# Patient Record
Sex: FEMALE | Race: BLACK OR AFRICAN AMERICAN | Employment: FULL TIME | URBAN - METROPOLITAN AREA
[De-identification: names, ages, dates, MRNs, and addresses within clinical notes are randomized per-mention and may not be internally consistent; named-entity substitution may affect disease eponyms.]

---

## 2017-05-22 ENCOUNTER — TELEPHONE (OUTPATIENT)
Dept: FAMILY MEDICINE CLINIC | Age: 52
End: 2017-05-22

## 2017-05-22 DIAGNOSIS — R92.8 ABNORMAL MAMMOGRAM: Primary | ICD-10-CM

## 2017-06-02 ENCOUNTER — HOSPITAL ENCOUNTER (OUTPATIENT)
Dept: MAMMOGRAPHY | Age: 52
Discharge: HOME | End: 2017-06-02
Payer: COMMERCIAL

## 2017-06-02 ENCOUNTER — TELEPHONE (OUTPATIENT)
Dept: FAMILY MEDICINE CLINIC | Age: 52
End: 2017-06-02

## 2017-06-02 DIAGNOSIS — Z13.9 SCREENING: Primary | ICD-10-CM

## 2017-06-02 DIAGNOSIS — R92.8 ABNORMAL MAMMOGRAM: ICD-10-CM

## 2017-06-02 DIAGNOSIS — Z12.31 VISIT FOR SCREENING MAMMOGRAM: ICD-10-CM

## 2017-06-02 PROCEDURE — 77063 BREAST TOMOSYNTHESIS BI: CPT

## 2017-06-30 ENCOUNTER — OFFICE VISIT (OUTPATIENT)
Dept: FAMILY MEDICINE CLINIC | Age: 52
End: 2017-06-30
Payer: COMMERCIAL

## 2017-06-30 VITALS
TEMPERATURE: 97.3 F | SYSTOLIC BLOOD PRESSURE: 129 MMHG | DIASTOLIC BLOOD PRESSURE: 79 MMHG | BODY MASS INDEX: 38.71 KG/M2 | WEIGHT: 205 LBS | HEIGHT: 61 IN | HEART RATE: 91 BPM

## 2017-06-30 DIAGNOSIS — Z23 NEED FOR TDAP VACCINATION: ICD-10-CM

## 2017-06-30 DIAGNOSIS — E66.9 OBESITY (BMI 30-39.9): Primary | ICD-10-CM

## 2017-06-30 DIAGNOSIS — Z13.9 SCREENING: ICD-10-CM

## 2017-06-30 DIAGNOSIS — Z99.89 OSA ON CPAP: ICD-10-CM

## 2017-06-30 DIAGNOSIS — G47.33 OSA ON CPAP: ICD-10-CM

## 2017-06-30 DIAGNOSIS — N95.1 PERIMENOPAUSAL: ICD-10-CM

## 2017-06-30 PROCEDURE — 90471 IMMUNIZATION ADMIN: CPT | Performed by: INTERNAL MEDICINE

## 2017-06-30 PROCEDURE — 99213 OFFICE O/P EST LOW 20 MIN: CPT | Performed by: INTERNAL MEDICINE

## 2017-06-30 PROCEDURE — 90715 TDAP VACCINE 7 YRS/> IM: CPT | Performed by: INTERNAL MEDICINE

## 2017-06-30 ASSESSMENT — ENCOUNTER SYMPTOMS
EYE REDNESS: 0
NAUSEA: 0
BACK PAIN: 0
BLURRED VISION: 0
ABDOMINAL PAIN: 0
DIARRHEA: 0

## 2017-07-01 ASSESSMENT — ENCOUNTER SYMPTOMS
SHORTNESS OF BREATH: 0
COUGH: 0

## 2017-08-28 ENCOUNTER — OFFICE VISIT (OUTPATIENT)
Dept: FAMILY MEDICINE CLINIC | Age: 52
End: 2017-08-28
Payer: COMMERCIAL

## 2017-08-28 VITALS
HEART RATE: 96 BPM | SYSTOLIC BLOOD PRESSURE: 135 MMHG | BODY MASS INDEX: 38.93 KG/M2 | RESPIRATION RATE: 16 BRPM | WEIGHT: 206.2 LBS | DIASTOLIC BLOOD PRESSURE: 86 MMHG | HEIGHT: 61 IN | TEMPERATURE: 98.1 F

## 2017-08-28 DIAGNOSIS — L29.9 ITCHING: Primary | ICD-10-CM

## 2017-08-28 DIAGNOSIS — Z99.89 OSA ON CPAP: ICD-10-CM

## 2017-08-28 DIAGNOSIS — Z12.11 SCREEN FOR COLON CANCER: ICD-10-CM

## 2017-08-28 DIAGNOSIS — E66.9 OBESITY (BMI 35.0-39.9 WITHOUT COMORBIDITY): ICD-10-CM

## 2017-08-28 DIAGNOSIS — G47.33 OSA ON CPAP: ICD-10-CM

## 2017-08-28 PROCEDURE — 99213 OFFICE O/P EST LOW 20 MIN: CPT | Performed by: INTERNAL MEDICINE

## 2017-08-28 RX ORDER — CETIRIZINE HYDROCHLORIDE 10 MG/1
10 TABLET ORAL DAILY
Qty: 30 TABLET | Refills: 0 | Status: SHIPPED | OUTPATIENT
Start: 2017-08-28 | End: 2017-10-20 | Stop reason: ALTCHOICE

## 2017-08-28 ASSESSMENT — ENCOUNTER SYMPTOMS
ABDOMINAL PAIN: 0
CONSTIPATION: 0
BLURRED VISION: 0
EYE REDNESS: 0
SHORTNESS OF BREATH: 0
COUGH: 0
NAUSEA: 0

## 2017-08-30 DIAGNOSIS — Z12.11 SCREEN FOR COLON CANCER: ICD-10-CM

## 2017-08-30 LAB
CONTROL: PRESENT
HEMOCCULT STL QL: NEGATIVE

## 2017-08-30 PROCEDURE — 82274 ASSAY TEST FOR BLOOD FECAL: CPT | Performed by: INTERNAL MEDICINE

## 2017-09-11 ENCOUNTER — TELEPHONE (OUTPATIENT)
Dept: FAMILY MEDICINE CLINIC | Age: 52
End: 2017-09-11

## 2017-09-11 RX ORDER — GUAIFENESIN/DEXTROMETHORPHAN 100-10MG/5
5 SYRUP ORAL 3 TIMES DAILY PRN
Qty: 120 ML | Refills: 0 | Status: SHIPPED | OUTPATIENT
Start: 2017-09-11 | End: 2017-09-21

## 2017-09-12 ENCOUNTER — OFFICE VISIT (OUTPATIENT)
Dept: FAMILY MEDICINE CLINIC | Age: 52
End: 2017-09-12
Payer: COMMERCIAL

## 2017-09-12 VITALS
TEMPERATURE: 97.4 F | RESPIRATION RATE: 16 BRPM | WEIGHT: 206.6 LBS | SYSTOLIC BLOOD PRESSURE: 132 MMHG | DIASTOLIC BLOOD PRESSURE: 83 MMHG | BODY MASS INDEX: 39.01 KG/M2 | HEIGHT: 61 IN | HEART RATE: 79 BPM

## 2017-09-12 DIAGNOSIS — J30.89 ALLERGIC RHINITIS DUE TO OTHER ALLERGIC TRIGGER, UNSPECIFIED RHINITIS SEASONALITY: Primary | ICD-10-CM

## 2017-09-12 PROCEDURE — 99213 OFFICE O/P EST LOW 20 MIN: CPT | Performed by: INTERNAL MEDICINE

## 2017-09-12 RX ORDER — ECHINACEA PURPUREA EXTRACT 125 MG
1 TABLET ORAL PRN
Qty: 1 BOTTLE | Refills: 3 | Status: SHIPPED | OUTPATIENT
Start: 2017-09-12 | End: 2017-10-20 | Stop reason: ALTCHOICE

## 2017-09-12 RX ORDER — LORATADINE 10 MG/1
10 TABLET ORAL DAILY
Qty: 30 TABLET | Refills: 1 | Status: SHIPPED | OUTPATIENT
Start: 2017-09-12 | End: 2017-10-20 | Stop reason: ALTCHOICE

## 2017-10-20 ENCOUNTER — OFFICE VISIT (OUTPATIENT)
Dept: FAMILY MEDICINE CLINIC | Age: 52
End: 2017-10-20
Payer: COMMERCIAL

## 2017-10-20 VITALS
SYSTOLIC BLOOD PRESSURE: 123 MMHG | HEIGHT: 61 IN | BODY MASS INDEX: 38.71 KG/M2 | OXYGEN SATURATION: 98 % | HEART RATE: 97 BPM | DIASTOLIC BLOOD PRESSURE: 80 MMHG | TEMPERATURE: 98 F | WEIGHT: 205 LBS

## 2017-10-20 DIAGNOSIS — J02.9 SORE THROAT: Primary | ICD-10-CM

## 2017-10-20 DIAGNOSIS — J06.9 VIRAL URI: ICD-10-CM

## 2017-10-20 LAB — S PYO AG THROAT QL: NORMAL

## 2017-10-20 PROCEDURE — 99214 OFFICE O/P EST MOD 30 MIN: CPT | Performed by: NURSE PRACTITIONER

## 2017-10-20 PROCEDURE — 87880 STREP A ASSAY W/OPTIC: CPT | Performed by: NURSE PRACTITIONER

## 2017-10-20 RX ORDER — BENZONATATE 200 MG/1
200 CAPSULE ORAL 3 TIMES DAILY PRN
Qty: 15 CAPSULE | Refills: 0 | Status: SHIPPED | OUTPATIENT
Start: 2017-10-20 | End: 2017-10-25

## 2017-10-20 RX ORDER — PREDNISONE 20 MG/1
40 TABLET ORAL DAILY
Qty: 10 TABLET | Refills: 0 | Status: SHIPPED | OUTPATIENT
Start: 2017-10-20 | End: 2017-10-25

## 2017-10-20 ASSESSMENT — ENCOUNTER SYMPTOMS
SORE THROAT: 1
VISUAL CHANGE: 0
SINUS PAIN: 0
ABDOMINAL PAIN: 0
SHORTNESS OF BREATH: 0
NAUSEA: 0
CHANGE IN BOWEL HABIT: 0
SWOLLEN GLANDS: 0
RHINORRHEA: 1
CHEST TIGHTNESS: 0
EYE DISCHARGE: 0
DIARRHEA: 0
EYE ITCHING: 1
VOMITING: 0
ALLERGIC/IMMUNOLOGIC NEGATIVE: 1
SINUS PRESSURE: 0
COUGH: 1

## 2018-08-27 ENCOUNTER — OFFICE VISIT (OUTPATIENT)
Dept: FAMILY MEDICINE CLINIC | Age: 53
End: 2018-08-27

## 2018-08-27 ENCOUNTER — HOSPITAL ENCOUNTER (OUTPATIENT)
Age: 53
Setting detail: SPECIMEN
Discharge: HOME | End: 2018-08-27
Payer: COMMERCIAL

## 2018-08-27 VITALS
TEMPERATURE: 98 F | WEIGHT: 204.6 LBS | BODY MASS INDEX: 38.63 KG/M2 | HEART RATE: 91 BPM | HEIGHT: 61 IN | DIASTOLIC BLOOD PRESSURE: 79 MMHG | RESPIRATION RATE: 16 BRPM | SYSTOLIC BLOOD PRESSURE: 123 MMHG

## 2018-08-27 DIAGNOSIS — N85.2 ENLARGED UTERUS: ICD-10-CM

## 2018-08-27 DIAGNOSIS — Z13.220 SCREENING FOR LIPID DISORDERS: ICD-10-CM

## 2018-08-27 DIAGNOSIS — R25.2 LEG CRAMPS: ICD-10-CM

## 2018-08-27 DIAGNOSIS — Z12.39 BREAST CANCER SCREENING: ICD-10-CM

## 2018-08-27 DIAGNOSIS — E66.9 OBESITY (BMI 35.0-39.9 WITHOUT COMORBIDITY): ICD-10-CM

## 2018-08-27 DIAGNOSIS — Z01.419 WELL WOMAN EXAM WITH ROUTINE GYNECOLOGICAL EXAM: Primary | ICD-10-CM

## 2018-08-27 DIAGNOSIS — G47.33 OSA ON CPAP: ICD-10-CM

## 2018-08-27 DIAGNOSIS — Z99.89 OSA ON CPAP: ICD-10-CM

## 2018-08-27 DIAGNOSIS — Z12.11 COLON CANCER SCREENING: ICD-10-CM

## 2018-08-27 PROCEDURE — G0444 DEPRESSION SCREEN ANNUAL: HCPCS | Performed by: INTERNAL MEDICINE

## 2018-08-27 PROCEDURE — 99396 PREV VISIT EST AGE 40-64: CPT | Performed by: INTERNAL MEDICINE

## 2018-08-27 ASSESSMENT — PATIENT HEALTH QUESTIONNAIRE - PHQ9
1. LITTLE INTEREST OR PLEASURE IN DOING THINGS: 1
8. MOVING OR SPEAKING SO SLOWLY THAT OTHER PEOPLE COULD HAVE NOTICED. OR THE OPPOSITE, BEING SO FIGETY OR RESTLESS THAT YOU HAVE BEEN MOVING AROUND A LOT MORE THAN USUAL: 1
5. POOR APPETITE OR OVEREATING: 1
SUM OF ALL RESPONSES TO PHQ9 QUESTIONS 1 & 2: 2
10. IF YOU CHECKED OFF ANY PROBLEMS, HOW DIFFICULT HAVE THESE PROBLEMS MADE IT FOR YOU TO DO YOUR WORK, TAKE CARE OF THINGS AT HOME, OR GET ALONG WITH OTHER PEOPLE: 0
6. FEELING BAD ABOUT YOURSELF - OR THAT YOU ARE A FAILURE OR HAVE LET YOURSELF OR YOUR FAMILY DOWN: 0
SUM OF ALL RESPONSES TO PHQ QUESTIONS 1-9: 6
9. THOUGHTS THAT YOU WOULD BE BETTER OFF DEAD, OR OF HURTING YOURSELF: 0
2. FEELING DOWN, DEPRESSED OR HOPELESS: 1
3. TROUBLE FALLING OR STAYING ASLEEP: 1
SUM OF ALL RESPONSES TO PHQ QUESTIONS 1-9: 6
7. TROUBLE CONCENTRATING ON THINGS, SUCH AS READING THE NEWSPAPER OR WATCHING TELEVISION: 0
4. FEELING TIRED OR HAVING LITTLE ENERGY: 1

## 2018-08-27 NOTE — PROGRESS NOTES
Mouth/Throat: Oropharynx is clear and moist.   Eyes: Pupils are equal, round, and reactive to light. Conjunctivae and EOM are normal. No scleral icterus. Neck: Normal range of motion. Neck supple. No thyromegaly present. Cardiovascular: Normal rate and regular rhythm. No murmur heard. Pulmonary/Chest: Effort normal and breath sounds normal. She has no wheezes. She has no rales. Abdominal: Soft. Bowel sounds are normal. She exhibits no distension. There is no tenderness. Genitourinary: Vagina normal, cervix normal, right adnexa normal, left adnexa normal and vulva normal. Uterus is enlarged. Uterus is not fixed and not tender. No vaginal discharge found. Musculoskeletal: Normal range of motion. She exhibits no edema. Neurological: She is alert and oriented to person, place, and time. Skin: Skin is warm and dry. She is not diaphoretic. Nursing note and vitals reviewed. LABORATORY FINDINGS:    CBC:  Lab Results   Component Value Date    WBC 4.6 03/03/2015    HGB 13.8 03/03/2015     03/03/2015     BMP:    Lab Results   Component Value Date     03/03/2015    K 4.0 03/03/2015     03/03/2015    CO2 23 03/03/2015    BUN 11 03/03/2015    CREATININE 0.68 03/03/2015    GLUCOSE 95 03/03/2015     HEMOGLOBIN A1C: No results found for: LABA1C  MICROALBUMIN URINE:   Lab Results   Component Value Date    MICROALBUR 7 02/06/2013     FASTING LIPID PANEL:  Lab Results   Component Value Date    CHOL 197 02/06/2013    HDL 85 02/06/2013    TRIG 151 (H) 02/06/2013     Lab Results   Component Value Date    LDLCHOLESTEROL 82 02/06/2013       LIVER PROFILE:  Lab Results   Component Value Date    ALT 21 02/06/2013    AST 21 02/06/2013    BILITOT 0.52 02/06/2013    LABALBU 4.2 02/06/2013      THYROID FUNCTION:   Lab Results   Component Value Date    TSH 0.43 03/05/2015      URINE ANALYSIS: No results found for: LABURIN  ASSESSMENT AND PLAN:    1.  Well woman exam with routine gynecological

## 2018-08-30 ENCOUNTER — HOSPITAL ENCOUNTER (OUTPATIENT)
Age: 53
Discharge: HOME | End: 2018-08-30

## 2018-08-30 ENCOUNTER — HOSPITAL ENCOUNTER (OUTPATIENT)
Dept: ULTRASOUND IMAGING | Age: 53
Discharge: HOME | End: 2018-09-01

## 2018-08-30 DIAGNOSIS — Z01.419 WELL WOMAN EXAM WITH ROUTINE GYNECOLOGICAL EXAM: ICD-10-CM

## 2018-08-30 DIAGNOSIS — R25.2 LEG CRAMPS: ICD-10-CM

## 2018-08-30 DIAGNOSIS — E66.9 OBESITY (BMI 35.0-39.9 WITHOUT COMORBIDITY): ICD-10-CM

## 2018-08-30 DIAGNOSIS — N85.2 ENLARGED UTERUS: ICD-10-CM

## 2018-08-30 LAB
ABSOLUTE EOS #: 0.2 K/UL (ref 0–0.4)
ABSOLUTE IMMATURE GRANULOCYTE: ABNORMAL K/UL (ref 0–0.3)
ABSOLUTE LYMPH #: 1.7 K/UL (ref 1–4.8)
ABSOLUTE MONO #: 0.4 K/UL (ref 0.2–0.8)
ALBUMIN SERPL-MCNC: 4.3 G/DL (ref 3.5–5.2)
ALBUMIN/GLOBULIN RATIO: ABNORMAL (ref 1–2.5)
ALP BLD-CCNC: 73 U/L (ref 35–104)
ALT SERPL-CCNC: 13 U/L (ref 5–33)
ANION GAP SERPL CALCULATED.3IONS-SCNC: 10 MMOL/L (ref 9–17)
AST SERPL-CCNC: 14 U/L
BASOPHILS # BLD: 0 % (ref 0–2)
BASOPHILS ABSOLUTE: 0 K/UL (ref 0–0.2)
BILIRUB SERPL-MCNC: 0.31 MG/DL (ref 0.3–1.2)
BUN BLDV-MCNC: 16 MG/DL (ref 6–20)
BUN/CREAT BLD: 20 (ref 9–20)
CALCIUM SERPL-MCNC: 9.1 MG/DL (ref 8.6–10.4)
CHLORIDE BLD-SCNC: 103 MMOL/L (ref 98–107)
CHOLESTEROL/HDL RATIO: 2.3
CHOLESTEROL: 219 MG/DL
CO2: 27 MMOL/L (ref 20–31)
CREAT SERPL-MCNC: 0.79 MG/DL (ref 0.5–0.9)
DIFFERENTIAL TYPE: ABNORMAL
EOSINOPHILS RELATIVE PERCENT: 5 % (ref 1–4)
GFR AFRICAN AMERICAN: >60 ML/MIN
GFR NON-AFRICAN AMERICAN: >60 ML/MIN
GFR SERPL CREATININE-BSD FRML MDRD: ABNORMAL ML/MIN/{1.73_M2}
GFR SERPL CREATININE-BSD FRML MDRD: ABNORMAL ML/MIN/{1.73_M2}
GLUCOSE BLD-MCNC: 104 MG/DL (ref 70–99)
HCT VFR BLD CALC: 38.9 % (ref 36–46)
HDLC SERPL-MCNC: 96 MG/DL
HEMOGLOBIN: 12.7 G/DL (ref 12–16)
IMMATURE GRANULOCYTES: ABNORMAL %
LDL CHOLESTEROL: 105 MG/DL (ref 0–130)
LYMPHOCYTES # BLD: 40 % (ref 24–44)
MAGNESIUM: 2 MG/DL (ref 1.6–2.6)
MCH RBC QN AUTO: 27.1 PG (ref 26–34)
MCHC RBC AUTO-ENTMCNC: 32.7 G/DL (ref 31–37)
MCV RBC AUTO: 82.8 FL (ref 80–100)
MONOCYTES # BLD: 9 % (ref 1–7)
NRBC AUTOMATED: ABNORMAL PER 100 WBC
PDW BLD-RTO: 13.6 % (ref 11.5–14.5)
PLATELET # BLD: 239 K/UL (ref 130–400)
PLATELET ESTIMATE: ABNORMAL
PMV BLD AUTO: 9.2 FL (ref 6–12)
POTASSIUM SERPL-SCNC: 4.2 MMOL/L (ref 3.7–5.3)
RBC # BLD: 4.7 M/UL (ref 4–5.2)
RBC # BLD: ABNORMAL 10*6/UL
SEG NEUTROPHILS: 46 % (ref 36–66)
SEGMENTED NEUTROPHILS ABSOLUTE COUNT: 2 K/UL (ref 1.8–7.7)
SODIUM BLD-SCNC: 140 MMOL/L (ref 135–144)
TOTAL PROTEIN: 7.3 G/DL (ref 6.4–8.3)
TRIGL SERPL-MCNC: 88 MG/DL
TSH SERPL DL<=0.05 MIU/L-ACNC: 0.52 MIU/L (ref 0.3–5)
VLDLC SERPL CALC-MCNC: ABNORMAL MG/DL (ref 1–30)
WBC # BLD: 4.3 K/UL (ref 3.5–11)
WBC # BLD: ABNORMAL 10*3/UL

## 2018-08-30 PROCEDURE — 76856 US EXAM PELVIC COMPLETE: CPT

## 2018-08-30 PROCEDURE — 80053 COMPREHEN METABOLIC PANEL: CPT

## 2018-08-30 PROCEDURE — 83735 ASSAY OF MAGNESIUM: CPT

## 2018-08-30 PROCEDURE — 36415 COLL VENOUS BLD VENIPUNCTURE: CPT

## 2018-08-30 PROCEDURE — 85025 COMPLETE CBC W/AUTO DIFF WBC: CPT

## 2018-08-30 PROCEDURE — 84443 ASSAY THYROID STIM HORMONE: CPT

## 2018-08-30 PROCEDURE — 80061 LIPID PANEL: CPT

## 2018-09-04 ASSESSMENT — ENCOUNTER SYMPTOMS
PHOTOPHOBIA: 0
EYE REDNESS: 0
COUGH: 0
ABDOMINAL PAIN: 0
SPUTUM PRODUCTION: 0
CONSTIPATION: 0
BLURRED VISION: 0
SHORTNESS OF BREATH: 0
NAUSEA: 0

## 2018-09-13 LAB — CYTOLOGY REPORT: NORMAL

## 2018-09-18 ENCOUNTER — HOSPITAL ENCOUNTER (OUTPATIENT)
Dept: MAMMOGRAPHY | Age: 53
Discharge: HOME | End: 2018-09-20

## 2018-09-18 DIAGNOSIS — Z12.39 BREAST CANCER SCREENING: ICD-10-CM

## 2018-09-18 PROCEDURE — 77063 BREAST TOMOSYNTHESIS BI: CPT

## 2019-02-17 ENCOUNTER — HOSPITAL ENCOUNTER (EMERGENCY)
Age: 54
Discharge: HOME | End: 2019-02-18
Attending: EMERGENCY MEDICINE

## 2019-02-17 ENCOUNTER — APPOINTMENT (OUTPATIENT)
Dept: GENERAL RADIOLOGY | Age: 54
End: 2019-02-17

## 2019-02-17 ENCOUNTER — APPOINTMENT (OUTPATIENT)
Dept: CT IMAGING | Age: 54
End: 2019-02-17

## 2019-02-17 VITALS
WEIGHT: 202.8 LBS | TEMPERATURE: 97.7 F | SYSTOLIC BLOOD PRESSURE: 149 MMHG | DIASTOLIC BLOOD PRESSURE: 81 MMHG | HEIGHT: 61 IN | BODY MASS INDEX: 38.29 KG/M2 | RESPIRATION RATE: 16 BRPM | HEART RATE: 96 BPM | OXYGEN SATURATION: 96 %

## 2019-02-17 DIAGNOSIS — S00.83XA FACIAL CONTUSION, INITIAL ENCOUNTER: ICD-10-CM
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
## 2019-02-17 PROCEDURE — 71046 X-RAY EXAM CHEST 2 VIEWS: CPT

## 2019-02-17 PROCEDURE — 99284 EMERGENCY DEPT VISIT MOD MDM: CPT

## 2019-02-17 PROCEDURE — 84703 CHORIONIC GONADOTROPIN ASSAY: CPT

## 2019-02-17 PROCEDURE — 70450 CT HEAD/BRAIN W/O DYE: CPT

## 2019-02-17 ASSESSMENT — PAIN DESCRIPTION - DESCRIPTORS: DESCRIPTORS: ACHING;NUMBNESS

## 2019-02-17 ASSESSMENT — PAIN SCALES - GENERAL: PAINLEVEL_OUTOF10: 6

## 2019-02-17 ASSESSMENT — ENCOUNTER SYMPTOMS
EYE REDNESS: 0
BACK PAIN: 0
CHEST TIGHTNESS: 0
ABDOMINAL DISTENTION: 0
FACIAL SWELLING: 0
EYE DISCHARGE: 0
EYE PAIN: 1
ABDOMINAL PAIN: 0
SHORTNESS OF BREATH: 0
PHOTOPHOBIA: 0

## 2019-02-17 ASSESSMENT — PAIN DESCRIPTION - ORIENTATION: ORIENTATION: RIGHT

## 2019-02-17 ASSESSMENT — PAIN DESCRIPTION - LOCATION: LOCATION: EYE

## 2019-02-17 ASSESSMENT — PAIN DESCRIPTION - PROGRESSION: CLINICAL_PROGRESSION: NOT CHANGED

## 2019-02-17 ASSESSMENT — PAIN DESCRIPTION - FREQUENCY: FREQUENCY: CONTINUOUS

## 2019-02-17 ASSESSMENT — PAIN DESCRIPTION - PAIN TYPE: TYPE: ACUTE PAIN

## 2019-02-17 ASSESSMENT — PAIN DESCRIPTION - ONSET: ONSET: ON-GOING

## 2019-02-18 ENCOUNTER — APPOINTMENT (OUTPATIENT)
Dept: CT IMAGING | Age: 54
End: 2019-02-18

## 2019-02-18 LAB — HCG, PREGNANCY URINE (POC): NEGATIVE

## 2019-02-18 PROCEDURE — 70486 CT MAXILLOFACIAL W/O DYE: CPT

## 2019-08-23 ENCOUNTER — HOSPITAL ENCOUNTER (EMERGENCY)
Age: 54
Discharge: HOME | End: 2019-08-23
Attending: EMERGENCY MEDICINE

## 2019-08-23 VITALS
DIASTOLIC BLOOD PRESSURE: 76 MMHG | TEMPERATURE: 98.1 F | HEART RATE: 81 BPM | OXYGEN SATURATION: 100 % | SYSTOLIC BLOOD PRESSURE: 131 MMHG | RESPIRATION RATE: 14 BRPM

## 2019-08-23 DIAGNOSIS — G89.29 CHRONIC PAIN OF RIGHT ANKLE: Primary | ICD-10-CM

## 2019-08-23 DIAGNOSIS — M25.571 CHRONIC PAIN OF RIGHT ANKLE: Primary | ICD-10-CM

## 2019-08-23 PROCEDURE — 6360000002 HC RX W HCPCS: Performed by: STUDENT IN AN ORGANIZED HEALTH CARE EDUCATION/TRAINING PROGRAM

## 2019-08-23 PROCEDURE — 99283 EMERGENCY DEPT VISIT LOW MDM: CPT

## 2019-08-23 PROCEDURE — 6370000000 HC RX 637 (ALT 250 FOR IP): Performed by: STUDENT IN AN ORGANIZED HEALTH CARE EDUCATION/TRAINING PROGRAM

## 2019-08-23 PROCEDURE — 96372 THER/PROPH/DIAG INJ SC/IM: CPT

## 2019-08-23 RX ORDER — ACETAMINOPHEN 500 MG
1000 TABLET ORAL ONCE
Status: COMPLETED | OUTPATIENT
Start: 2019-08-23 | End: 2019-08-23

## 2019-08-23 RX ORDER — IBUPROFEN 800 MG/1
800 TABLET ORAL EVERY 8 HOURS PRN
Qty: 30 TABLET | Refills: 0 | Status: SHIPPED | OUTPATIENT
Start: 2019-08-23

## 2019-08-23 RX ORDER — KETOROLAC TROMETHAMINE 30 MG/ML
30 INJECTION, SOLUTION INTRAMUSCULAR; INTRAVENOUS ONCE
Status: COMPLETED | OUTPATIENT
Start: 2019-08-23 | End: 2019-08-23

## 2019-08-23 RX ADMIN — ACETAMINOPHEN 1000 MG: 500 TABLET ORAL at 14:49

## 2019-08-23 RX ADMIN — KETOROLAC TROMETHAMINE 30 MG: 30 INJECTION, SOLUTION INTRAMUSCULAR at 14:48

## 2019-08-23 ASSESSMENT — PAIN DESCRIPTION - PROGRESSION: CLINICAL_PROGRESSION: GRADUALLY WORSENING

## 2019-08-23 ASSESSMENT — PAIN SCALES - GENERAL
PAINLEVEL_OUTOF10: 9
PAINLEVEL_OUTOF10: 9

## 2019-08-23 ASSESSMENT — PAIN DESCRIPTION - PAIN TYPE: TYPE: ACUTE PAIN

## 2019-08-23 ASSESSMENT — PAIN DESCRIPTION - ORIENTATION: ORIENTATION: RIGHT

## 2019-08-23 ASSESSMENT — PAIN DESCRIPTION - ONSET: ONSET: PROGRESSIVE

## 2019-08-23 ASSESSMENT — PAIN DESCRIPTION - DESCRIPTORS: DESCRIPTORS: ACHING

## 2019-08-23 ASSESSMENT — PAIN DESCRIPTION - LOCATION: LOCATION: FOOT;HIP

## 2019-08-27 ENCOUNTER — OFFICE VISIT (OUTPATIENT)
Dept: INTERNAL MEDICINE | Age: 54
End: 2019-08-27

## 2019-08-27 VITALS
HEART RATE: 84 BPM | BODY MASS INDEX: 33.79 KG/M2 | DIASTOLIC BLOOD PRESSURE: 86 MMHG | SYSTOLIC BLOOD PRESSURE: 139 MMHG | WEIGHT: 179 LBS | HEIGHT: 61 IN

## 2019-08-27 DIAGNOSIS — M25.551 RIGHT HIP PAIN: ICD-10-CM

## 2019-08-27 PROCEDURE — 99213 OFFICE O/P EST LOW 20 MIN: CPT | Performed by: STUDENT IN AN ORGANIZED HEALTH CARE EDUCATION/TRAINING PROGRAM

## 2019-08-27 PROCEDURE — 99211 OFF/OP EST MAY X REQ PHY/QHP: CPT | Performed by: INTERNAL MEDICINE

## 2019-09-04 ASSESSMENT — ENCOUNTER SYMPTOMS
CHEST TIGHTNESS: 0
NAUSEA: 0
SORE THROAT: 0
BACK PAIN: 0
ABDOMINAL PAIN: 0
SHORTNESS OF BREATH: 0
TROUBLE SWALLOWING: 0
WHEEZING: 0
COUGH: 0
PHOTOPHOBIA: 0
VOMITING: 0

## 2019-09-04 NOTE — ED PROVIDER NOTES
swallowing. Eyes: Negative for photophobia. Respiratory: Negative for cough, chest tightness, shortness of breath and wheezing. Cardiovascular: Negative for chest pain and palpitations. Gastrointestinal: Negative for abdominal pain, nausea and vomiting. Endocrine: Negative for polyuria. Genitourinary: Negative for dysuria and flank pain. Musculoskeletal: Negative for back pain and neck pain. Left ankle pain and swelling. Skin: Negative for rash and wound. Neurological: Negative for syncope, weakness, light-headedness and headaches. Psychiatric/Behavioral: Negative for agitation and confusion. PHYSICAL EXAM   (up to 7 for level 4, 8 or more for level 5)      INITIAL VITALS:   /76   Pulse 81   Temp 98.1 °F (36.7 °C) (Oral)   Resp 14   SpO2 100%     Physical Exam   Constitutional: She is oriented to person, place, and time. She appears well-developed and well-nourished. HENT:   Head: Normocephalic and atraumatic. Nose: Nose normal.   Mouth/Throat: Oropharynx is clear and moist.   Eyes: Pupils are equal, round, and reactive to light. EOM are normal.   Neck: Normal range of motion. Neck supple. Cardiovascular: Normal rate, regular rhythm, normal heart sounds and intact distal pulses. Pulmonary/Chest: Breath sounds normal. No respiratory distress. She has no wheezes. She has no rales. Abdominal: Soft. Bowel sounds are normal. She exhibits no distension. There is no tenderness. Musculoskeletal: Normal range of motion. She exhibits no edema or deformity. Left heel with tenderness, minimal swelling. Active and passive range of motion of the ankle joint normal.  No deformities present. No fluctuant masses, erythema of the skin. 2+ dorsalis pedis and posterior tibial pulses. Neurological: She is alert and oriented to person, place, and time. She has normal reflexes. Skin: Skin is warm and dry. No rash noted. No erythema.    Psychiatric: She has a normal mood and

## 2020-04-21 ENCOUNTER — HOSPITAL ENCOUNTER (OUTPATIENT)
Age: 55
Setting detail: SPECIMEN
Discharge: HOME | End: 2020-04-21

## 2020-04-21 ENCOUNTER — OFFICE VISIT (OUTPATIENT)
Dept: PRIMARY CARE CLINIC | Age: 55
End: 2020-04-21

## 2020-04-21 VITALS — TEMPERATURE: 97.6 F | HEART RATE: 102 BPM | OXYGEN SATURATION: 98 %

## 2020-04-21 PROCEDURE — 99213 OFFICE O/P EST LOW 20 MIN: CPT | Performed by: FAMILY MEDICINE

## 2020-04-21 ASSESSMENT — ENCOUNTER SYMPTOMS
CHANGE IN BOWEL HABIT: 1
NAUSEA: 0
VOMITING: 0
WHEEZING: 0
SHORTNESS OF BREATH: 0
SWOLLEN GLANDS: 0
DIARRHEA: 1
SINUS PRESSURE: 0
SINUS PAIN: 0
COUGH: 0
SORE THROAT: 1

## 2020-04-21 NOTE — PROGRESS NOTES
[address]  [address] [place] [address]  Dept: [phone]  Dept Fax: [phone]    Ajith Cano is a 53 y.o. female who presents today for her medical conditions/complaintsas noted below. Ajith Cano is c/o of Pharyngitis (headache diarrhea > 6 days )        HPI:     Pharyngitis   This is a new problem. The current episode started in the past 7 days (6 days). The problem occurs constantly. The problem has been gradually improving. Associated symptoms include a change in bowel habit (diarrhea), headaches and a sore throat. Pertinent negatives include no chills, congestion, coughing, fever, myalgias, nausea, rash, swollen glands or vomiting. Nothing aggravates the symptoms. She has tried nothing for the symptoms. No Hx of asthma, allergies  Hx of sleep apnea  Works at [employer] but reports no known COVID exposures  Past Medical History:   Diagnosis Date    Calcium kidney stone     history of.     Depression     Hematuria     Obesity (BMI 35.0-39.9 without comorbidity) 3/30/2016    Sleep apnea     Past medical history reviewed and pertinent positives/negatives in the HPI    Past Surgical History:   Procedure Laterality Date     SECTION      x3    FOOT SURGERY      left foot has a pin    TONSILLECTOMY AND ADENOIDECTOMY         Family History   Problem Relation Age of Onset    Kidney Disease Mother     High Blood Pressure Mother     Other Father         dementia    High Blood Pressure Father     High Blood Pressure Sister     Other Sister         Sleep Apena    High Blood Pressure Brother     Other Brother         Sleep Apnea    High Blood Pressure Sister     Other Sister         Sleep Apnea       Social History     Tobacco Use    Smoking status: Never Smoker    Smokeless tobacco: Never Used   Substance Use Topics    Alcohol use: No     Alcohol/week: 0.0 standard drinks      Current Outpatient Medications under investigation) who do not need to be hospitalized  and   People with confirmed COVID-19 who were hospitalized and determined to be medically stable to go home    Your healthcare provider and public health staff will evaluate whether you can be cared for at home. If it is determined that you do not need to be hospitalized and can be isolated at home, you will be monitored by staff from your local or state health department. You should follow the prevention steps below until a healthcare provider or local or state health department says you can return to your normal activities. Stay home except to get medical care  People who are mildly ill with COVID-19 are able to isolate at home during their illness. You should restrict activities outside your home, except for getting medical care. Do not go to work, school, or public areas. Avoid using public transportation, ride-sharing, or taxis. Separate yourself from other people and animals in your home  People: As much as possible, you should stay in a specific room and away from other people in your home. Also, you should use a separate bathroom, if available. Animals: You should restrict contact with pets and other animals while you are sick with COVID-19, just like you would around other people. Although there have not been reports of pets or other animals becoming sick with COVID-19, it is still recommended that people sick with COVID-19 limit contact with animals until more information is known about the virus. When possible, have another member of your household care for your animals while you are sick. If you are sick with COVID-19, avoid contact with your pet, including petting, snuggling, being kissed or licked, and sharing food. If you must care for your pet or be around animals while you are sick, wash your hands before and after you interact with pets and wear a facemask.   Call ahead before visiting your doctor  If you have a medical appointment, call the healthcare provider and tell them that you have or may have COVID-19. This will help the healthcare providers office take steps to keep other people from getting infected or exposed. Wear a facemask  You should wear a facemask when you are around other people (e.g., sharing a room or vehicle) or pets and before you enter a healthcare providers office. If you are not able to wear a facemask (for example, because it causes trouble breathing), then people who live with you should not stay in the same room with you, or they should wear a facemask if they enter your room. Cover your coughs and sneezes  Cover your mouth and nose with a tissue when you cough or sneeze. Throw used tissues in a lined trash can. Immediately wash your hands with soap and water for at least 20 seconds or, if soap and water are not available, clean your hands with an alcohol-based hand  that contains at least 60% alcohol. Clean your hands often  Wash your hands often with soap and water for at least 20 seconds, especially after blowing your nose, coughing, or sneezing; going to the bathroom; and before eating or preparing food. If soap and water are not readily available, use an alcohol-based hand  with at least 60% alcohol, covering all surfaces of your hands and rubbing them together until they feel dry. Soap and water are the best option if hands are visibly dirty. Avoid touching your eyes, nose, and mouth with unwashed hands. Avoid sharing personal household items  You should not share dishes, drinking glasses, cups, eating utensils, towels, or bedding with other people or pets in your home. After using these items, they should be washed thoroughly with soap and water. Clean all high-touch surfaces everyday  High touch surfaces include counters, tabletops, doorknobs, bathroom fixtures, toilets, phones, keyboards, tablets, and bedside tables.  Also, clean any surfaces that may have blood, stool, or body fluids orders of the defined types were placed in this encounter. Patient given educational materials - see patient instructions. Discussed use, benefit, and side effects of prescribed medications. All patient questions answered. Pt voiced understanding. Patient agreed with treatment plan. Follow up as directed.      Electronicallysigned by Brandon Macedo MD on 4/21/2020 at 10:02 AM

## 2020-04-21 NOTE — PATIENT INSTRUCTIONS
local health department or occupational health professionals, as appropriate. When working with your local health department check their available hours. If you have a medical emergency and need to call 911, notify the dispatch personnel that you have, or are being evaluated for COVID-19. If possible, put on a facemask before emergency medical services arrive. Discontinuing home isolation  Patients with confirmed COVID-19 should remain under home isolation precautions until the risk of secondary transmission to others is thought to be low. The decision to discontinue home isolation precautions should be made on a case-by-case basis, in consultation with healthcare providers and state and local health departments.

## 2020-04-22 LAB — SARS-COV-2, NAA: NOT DETECTED

## 2020-11-05 ENCOUNTER — OFFICE VISIT (OUTPATIENT)
Dept: FAMILY MEDICINE CLINIC | Age: 55
End: 2020-11-05

## 2020-11-05 VITALS
WEIGHT: 193 LBS | DIASTOLIC BLOOD PRESSURE: 74 MMHG | SYSTOLIC BLOOD PRESSURE: 112 MMHG | TEMPERATURE: 97.9 F | HEIGHT: 61 IN | OXYGEN SATURATION: 97 % | BODY MASS INDEX: 36.44 KG/M2 | HEART RATE: 74 BPM

## 2020-11-05 PROCEDURE — 99203 OFFICE O/P NEW LOW 30 MIN: CPT | Performed by: NURSE PRACTITIONER

## 2020-11-05 RX ORDER — METHOCARBAMOL 500 MG/1
TABLET, FILM COATED ORAL
COMMUNITY
Start: 2020-11-03 | End: 2020-11-05 | Stop reason: ALTCHOICE

## 2020-11-05 RX ORDER — TIZANIDINE 2 MG/1
2 TABLET ORAL 3 TIMES DAILY PRN
Qty: 30 TABLET | Refills: 0 | Status: SHIPPED | OUTPATIENT
Start: 2020-11-05 | End: 2022-02-07

## 2020-11-05 NOTE — PATIENT INSTRUCTIONS
Patient Education        Low Back Pain: Exercises  Introduction  Here are some examples of exercises for you to try. The exercises may be suggested for a condition or for rehabilitation. Start each exercise slowly. Ease off the exercises if you start to have pain. You will be told when to start these exercises and which ones will work best for you. How to do the exercises  Press-up   1. Lie on your stomach, supporting your body with your forearms. 2. Press your elbows down into the floor to raise your upper back. As you do this, relax your stomach muscles and allow your back to arch without using your back muscles. As your press up, do not let your hips or pelvis come off the floor. 3. Hold for 15 to 30 seconds, then relax. 4. Repeat 2 to 4 times. Alternate arm and leg (bird dog) exercise   Do this exercise slowly. Try to keep your body straight at all times, and do not let one hip drop lower than the other. 1. Start on the floor, on your hands and knees. 2. Tighten your belly muscles. 3. Raise one leg off the floor, and hold it straight out behind you. Be careful not to let your hip drop down, because that will twist your trunk. 4. Hold for about 6 seconds, then lower your leg and switch to the other leg. 5. Repeat 8 to 12 times on each leg. 6. Over time, work up to holding for 10 to 30 seconds each time. 7. If you feel stable and secure with your leg raised, try raising the opposite arm straight out in front of you at the same time. Knee-to-chest exercise   1. Lie on your back with your knees bent and your feet flat on the floor. 2. Bring one knee to your chest, keeping the other foot flat on the floor (or keeping the other leg straight, whichever feels better on your lower back). 3. Keep your lower back pressed to the floor. Hold for at least 15 to 30 seconds. 4. Relax, and lower the knee to the starting position. 5. Repeat with the other leg. Repeat 2 to 4 times with each leg.   6. To get more stretch, put your other leg flat on the floor while pulling your knee to your chest.    Curl-ups   1. Lie on the floor on your back with your knees bent at a 90-degree angle. Your feet should be flat on the floor, about 12 inches from your buttocks. 2. Cross your arms over your chest. If this bothers your neck, try putting your hands behind your neck (not your head), with your elbows spread apart. 3. Slowly tighten your belly muscles and raise your shoulder blades off the floor. 4. Keep your head in line with your body, and do not press your chin to your chest.  5. Hold this position for 1 or 2 seconds, then slowly lower yourself back down to the floor. 6. Repeat 8 to 12 times. Pelvic tilt exercise   1. Lie on your back with your knees bent. 2. \"Brace\" your stomach. This means to tighten your muscles by pulling in and imagining your belly button moving toward your spine. You should feel like your back is pressing to the floor and your hips and pelvis are rocking back. 3. Hold for about 6 seconds while you breathe smoothly. 4. Repeat 8 to 12 times. Heel dig bridging   1. Lie on your back with both knees bent and your ankles bent so that only your heels are digging into the floor. Your knees should be bent about 90 degrees. 2. Then push your heels into the floor, squeeze your buttocks, and lift your hips off the floor until your shoulders, hips, and knees are all in a straight line. 3. Hold for about 6 seconds as you continue to breathe normally, and then slowly lower your hips back down to the floor and rest for up to 10 seconds. 4. Do 8 to 12 repetitions. Hamstring stretch in doorway   1. Lie on your back in a doorway, with one leg through the open door. 2. Slide your leg up the wall to straighten your knee. You should feel a gentle stretch down the back of your leg. 3. Hold the stretch for at least 15 to 30 seconds. Do not arch your back, point your toes, or bend either knee.  Keep one heel touching the floor and the other heel touching the wall. 4. Repeat with your other leg. 5. Do 2 to 4 times for each leg. Hip flexor stretch   1. Kneel on the floor with one knee bent and one leg behind you. Place your forward knee over your foot. Keep your other knee touching the floor. 2. Slowly push your hips forward until you feel a stretch in the upper thigh of your rear leg. 3. Hold the stretch for at least 15 to 30 seconds. Repeat with your other leg. 4. Do 2 to 4 times on each side. Wall sit   1. Stand with your back 10 to 12 inches away from a wall. 2. Lean into the wall until your back is flat against it. 3. Slowly slide down until your knees are slightly bent, pressing your lower back into the wall. 4. Hold for about 6 seconds, then slide back up the wall. 5. Repeat 8 to 12 times. Follow-up care is a key part of your treatment and safety. Be sure to make and go to all appointments, and call your doctor if you are having problems. It's also a good idea to know your test results and keep a list of the medicines you take. Where can you learn more? Go to [email or web address] org and sign in to your [employer] account. Enter E737 in the [employer] logo box to learn more about \"Low Back Pain: Exercises. \"     If you do not have an account, please click on the \"Sign Up Now\" link. Current as of: March 2, 2020               Content Version: 12.6  © 2006-2020 [employer]. Care instructions adapted under license by [facility] ([facility]). If you have questions about a medical condition or this instruction, always ask your healthcare professional. [place] [address] any warranty or liability for your use of this information.

## 2020-11-05 NOTE — PROGRESS NOTES
infection     Chest pain     Depression     JESSE on CPAP     Obesity (BMI 35.0-39.9 without comorbidity)     Past Medical History:   Diagnosis Date    Calcium kidney stone     history of.  Depression     Hematuria     Obesity (BMI 35.0-39.9 without comorbidity) 3/30/2016    Sleep apnea       Past Surgical History:   Procedure Laterality Date     SECTION      x3    FOOT SURGERY      left foot has a pin    TONSILLECTOMY AND ADENOIDECTOMY       Family History   Problem Relation Age of Onset    Kidney Disease Mother     High Blood Pressure Mother     Other Father         dementia    High Blood Pressure Father     High Blood Pressure Sister     Other Sister         Sleep Apena    High Blood Pressure Brother     Other Brother         Sleep Apnea    High Blood Pressure Sister     Other Sister         Sleep Apnea     Social History     Tobacco Use    Smoking status: Never Smoker    Smokeless tobacco: Never Used   Substance Use Topics    Alcohol use: No     Alcohol/week: 0.0 standard drinks     ALLERGIES:  No Known Allergies       Subjective   Review of Systems   · Constitutional:  Negative for activity change, appetite change,unexpected weight change, chills, fever, and fatigue. · HENT: Negative for ear pain, sore throat,  Rhinorrhea, sinus pain, sinus pressure, congestion. · Eyes:  Negative for pain and discharge. · Respiratory:  Negative for chest tightness, shortness of breath, wheezing, and cough. · Cardiovascular:  Negative for chest pain, palpitations and leg swelling. · Gastrointestinal: Negative for abdominal pain, blood in stool, constipation,diarrhea, nausea and vomiting. · Endocrine: Negative for cold intolerance, heat intolerance, polydipsia, polyphagia and polyuria. · Genitourinary: Negative for difficulty urinating, dysuria, flank pain, frequency, hematuria and urgency.  Positive for vaginal spotting  · Musculoskeletal: Negative for arthralgias, back pain, joint swelling, myalgias, neck pain and neck stiffness. Positive for low back pain  · Skin: Negative for rash and wound. · Allergic/Immunologic: Negative for environmental allergies and food allergies. · Neurological:  Negative for dizziness, light-headedness, numbness and headaches. · Hematological:  Negative for adenopathy. Does not bruise/bleed easily. · Psychiatric/Behavioral: Negative for self-injury, sleep disturbance and suicidal ideas. Objective   Physical Exam   PHYSICAL EXAM:   · Constitutional: Cheikh Deutsch is oriented to person, place, and time. Vital signs are normal. Appears well-developed and well-nourished. · HEENT:   · Head: Normocephalic and atraumatic. · Eyes:PERRL, EOMI, Conjunctiva normal, No discharge. · Neck: Full passive range of motion. Non-tender on palpation. Neck supple. No thyromegaly present. Trachea normal.  · Cardiovascular: Normal rate, regular rhythm, S1, S2, no murmur, no gallop, no friction rub, intact distal pulses. · Pulmonary/Chest: Breath sounds are clear throughout, No respiratory distress, No wheezing, No chest tenderness. Effort normal  · Abdominal: Soft. Normal appearance, bowel sounds are present and normoactive. There is no hepatosplenomegaly. There is no tenderness. There is no CVA tenderness. · Musculoskeletal: Extremities appear regular and symmetric. No evident masses, lesions, foreign bodies, or other abnormalities. No edema. No tenderness on palpation. Joints are stable. Full ROM, strength and tone are within normal limits. Tenderness to lumbar spine and left of lumbar spine with palpation. Limping gait noted  · Neurological: Alert and oriented to person, place, and time. Normal motor function, Normal sensory function, No focal deficits noted. He has normal strength. · Skin: Skin is warm, dry and intact. No obvious lesions on exposed skin  · Psychiatric: Normal mood and affect.  Speech is normal and behavior is normal.     Nursing note and vitals reviewed. Blood pressure 112/74, pulse 74, temperature 97.9 °F (36.6 °C), temperature source Temporal, height 5' 1\" (1.549 m), weight 193 lb (87.5 kg), SpO2 97 %, not currently breastfeeding. Body mass index is 36.47 kg/m². Wt Readings from Last 3 Encounters:   11/05/20 193 lb (87.5 kg)   08/27/19 179 lb (81.2 kg)   02/17/19 202 lb 12.8 oz (92 kg)     BP Readings from Last 3 Encounters:   11/05/20 112/74   08/27/19 139/86   08/23/19 131/76       Hospital Outpatient Visit on 04/21/2020   Component Date Value Ref Range Status    SARS-CoV-2, SHYAM 04/21/2020 Not Detected  Not Detected Final    Comment: (NOTE)  This test was developed and its performance characteristics  determined by [employer]. This test has not been FDA  cleared or approved. This test has been authorized by FDA under an  Emergency Use Authorization (EUA). This test has been validated in  accordance with the FDA's Guidance Document (Policy for [address] in Laboratories Certified to Perform High Complexity Testing  under CLIA prior to Emergency Use Authorization for Coronavirus  [id number] during the [facility]) issued on February 29th, 2020. FDA independent review of this validation is pending. This test is only authorized for the duration of time the declaration  that circumstances exist justifying the authorization of the  emergency use of in vitro diagnostic tests for detection of SARS-CoV-  2 virus and/or diagnosis of COVID-19 infection under section  564(b)(1) of the Act, 21 U. S.C. 963OFL-7(Q)(7), unless the  authorization is terminated or revoked sooner. Performed At: [place] [address] [place] [id number]  Jah Charles MD AS:[id number]       No results found for this visit on 11/05/20.     Completed Orders/Prescriptions   Orders Placed This Encounter   Medications    tiZANidine (ZANAFLEX) 2 MG tablet     Sig: Take 1 tablet by mouth 3 times daily as

## 2020-11-09 ENCOUNTER — TELEPHONE (OUTPATIENT)
Dept: FAMILY MEDICINE CLINIC | Age: 55
End: 2020-11-09

## 2020-11-10 ENCOUNTER — TELEPHONE (OUTPATIENT)
Dept: FAMILY MEDICINE CLINIC | Age: 55
End: 2020-11-10

## 2020-11-11 ENCOUNTER — TELEPHONE (OUTPATIENT)
Dept: FAMILY MEDICINE CLINIC | Age: 55
End: 2020-11-11

## 2020-11-11 NOTE — TELEPHONE ENCOUNTER
Please let her know that her Lumbar Xray shows mild to moderate degenerative features which means arthritis. She is ok to return to work.

## 2020-11-11 NOTE — TELEPHONE ENCOUNTER
Patient states she walks up to 8 miles per day with her job. She was offered a promotion but, is concerned because the walking would be even more. She is asking long term will this be harmful to her back.

## 2020-11-11 NOTE — TELEPHONE ENCOUNTER
Spoke to patient. She states when her insurance kicks in she will call back to get the referral for PT. She said to thank you!

## 2021-01-07 ENCOUNTER — OFFICE VISIT (OUTPATIENT)
Dept: OBGYN CLINIC | Age: 56
End: 2021-01-07
Payer: COMMERCIAL

## 2021-01-07 VITALS
WEIGHT: 187 LBS | BODY MASS INDEX: 35.3 KG/M2 | SYSTOLIC BLOOD PRESSURE: 120 MMHG | DIASTOLIC BLOOD PRESSURE: 72 MMHG | HEIGHT: 61 IN

## 2021-01-07 DIAGNOSIS — R25.2 LEG CRAMPS: ICD-10-CM

## 2021-01-07 DIAGNOSIS — N95.0 POSTMENOPAUSAL BLEEDING: Primary | ICD-10-CM

## 2021-01-07 DIAGNOSIS — Z12.31 ENCOUNTER FOR SCREENING MAMMOGRAM FOR MALIGNANT NEOPLASM OF BREAST: ICD-10-CM

## 2021-01-07 PROCEDURE — 99204 OFFICE O/P NEW MOD 45 MIN: CPT | Performed by: OBSTETRICS & GYNECOLOGY

## 2021-01-07 RX ORDER — VIT C/B6/B5/MAGNESIUM/HERB 173 50-5-6-5MG
CAPSULE ORAL
COMMUNITY

## 2021-01-07 ASSESSMENT — ENCOUNTER SYMPTOMS
CONSTIPATION: 0
VOMITING: 0
ABDOMINAL PAIN: 0
COUGH: 0
WHEEZING: 0
DIARRHEA: 0
NAUSEA: 0

## 2021-01-07 NOTE — PROGRESS NOTES
[facility], [address] [place] [address]  DATE OF VISIT:  21    Pily Laboy    :  1965  CHIEF COMPLAINT:    Chief Complaint   Patient presents with    Establish Care    Vaginal Bleeding     Started with spotting then picked up a little for a couple days. Hurt her back at work prior. HPI :   Pily Laboy is a 63 y.o. female here to establish care as a new patient and for postmenopausal bleeding. She hurt her back at work in November, then started bleeding after. She states it was just spotting, lasting a few days, and was just present with wiping. LMP summer of 2019, previously had been every 3-4 months. Denies hot flushes or vaginal dryness. Past Medical History:   Diagnosis Date    Calcium kidney stone     history of.     Depression     Hematuria     Obesity (BMI 35.0-39.9 without comorbidity) 3/30/2016    Sleep apnea       Past Surgical History:   Procedure Laterality Date     SECTION      x3    FOOT SURGERY      left foot has a pin    TONSILLECTOMY AND ADENOIDECTOMY       Family History   Problem Relation Age of Onset    Kidney Disease Mother     High Blood Pressure Mother     Other Father         dementia    High Blood Pressure Father     High Blood Pressure Sister     Other Sister         Sleep Apena    High Blood Pressure Brother     Other Brother         Sleep Apnea    High Blood Pressure Sister     Other Sister         Sleep Apnea     Social History     Tobacco Use   Smoking Status Never Smoker   Smokeless Tobacco Never Used     Social History     Substance and Sexual Activity   Alcohol Use No    Alcohol/week: 0.0 standard drinks     Current Outpatient Medications   Medication Sig Dispense Refill    Turmeric (QC TUMERIC COMPLEX) 500 MG CAPS Take by mouth      Ascorbic Acid (TREVOR-C PO) Take by mouth      ibuprofen (ADVIL;MOTRIN) 800 MG tablet Take 1 tablet by mouth every 8 hours as needed for Pain 30 tablet 0  Multiple Vitamins-Minerals (THERAPEUTIC MULTIVITAMIN-MINERALS) tablet Take 1 tablet by mouth daily.  tiZANidine (ZANAFLEX) 2 MG tablet Take 1 tablet by mouth 3 times daily as needed (pain) (Patient not taking: Reported on 2021) 30 tablet 0     No current facility-administered medications for this visit. Allergies:  Patient has no known allergies. Gynecologic History:  No LMP recorded. Patient is premenopausal.  Sexually Active: No  STD History: No      OB History    Para Term  AB Living   3 3 3 0 0 0   SAB TAB Ectopic Molar Multiple Live Births   0 0 0 0 0 0       Review of Systems   Constitutional: Negative for chills and fever. HENT: Negative for hearing loss. Respiratory: Negative for cough and wheezing. Cardiovascular: Negative for chest pain and palpitations. Gastrointestinal: Negative for abdominal pain, constipation, diarrhea, nausea and vomiting. Genitourinary: Negative for dysuria, frequency and urgency. Musculoskeletal: Negative for myalgias. Skin: Negative for rash. Neurological: Negative for dizziness, weakness and headaches. Hematological: Does not bruise/bleed easily. Psychiatric/Behavioral: Negative for suicidal ideas. /72 (Position: Sitting, Cuff Size: Large Adult)   Ht 5' 1\" (1.549 m)   Wt 187 lb (84.8 kg)   BMI 35.33 kg/m²     Physical Exam  Constitutional:       Appearance: She is well-developed. HENT:      Head: Normocephalic. Neck:      Musculoskeletal: Normal range of motion. Thyroid: No thyromegaly. Cardiovascular:      Rate and Rhythm: Normal rate and regular rhythm. Pulmonary:      Effort: Pulmonary effort is normal. No respiratory distress. Abdominal:      General: There is no distension. Palpations: Abdomen is soft. Tenderness: There is no abdominal tenderness. Genitourinary:     Labia:         Right: No rash, tenderness, lesion or injury. Left: No rash, tenderness, lesion or injury. Vagina: No signs of injury and foreign body. No vaginal discharge, erythema or tenderness. Cervix: No cervical motion tenderness, discharge or friability. Uterus: Not deviated, not enlarged, not fixed and not tender. Adnexa:         Right: No mass, tenderness or fullness. Left: No mass, tenderness or fullness. Musculoskeletal: Normal range of motion. Skin:     General: Skin is warm and dry. Neurological:      Mental Status: She is alert and oriented to person, place, and time. Psychiatric:         Behavior: Behavior normal.         Thought Content: Thought content normal.         Judgment: Judgment normal.         ASSESSMENT:  Lisa Mcclendon is a 63 y.o. female      ICD-10-CM    1. Postmenopausal bleeding  N95.0 TSH with Reflex     US PELVIS COMPLETE NON-OB TRANSABDOMINAL AND TRANSVAGINAL     CANCELED: US PELVIS COMPLETE NON-OB TRANSABDOMINAL AND TRANSVAGINAL   2. Leg cramps  R25.2 CBC Auto Differential     Basic Metabolic Panel     Magnesium   3. Encounter for screening mammogram for malignant neoplasm of breast  Z12.31 OSWALDO DIGITAL SCREEN W OR WO CAD BILATERAL       PLAN:    Discussed obtaining US for bleeding to determine if EMB is necessary. Pt agreeable. She requests electrolyte testing as she's been having leg cramps at night, will check CBC, BMP and Magnesium (as well as TSH for basic health maintenance). Requests mammogram as well.      Electronically signed by Gabriela Ontiveros MD on 1/7/2021 at 12:28 PM

## 2021-01-19 ENCOUNTER — PROCEDURE VISIT (OUTPATIENT)
Dept: OBGYN CLINIC | Age: 56
End: 2021-01-19
Payer: COMMERCIAL

## 2021-01-19 ENCOUNTER — HOSPITAL ENCOUNTER (OUTPATIENT)
Age: 56
Setting detail: SPECIMEN
Discharge: HOME | End: 2021-01-19
Payer: COMMERCIAL

## 2021-01-19 DIAGNOSIS — R25.2 LEG CRAMPS: ICD-10-CM

## 2021-01-19 DIAGNOSIS — N95.0 POSTMENOPAUSAL BLEEDING: ICD-10-CM

## 2021-01-19 DIAGNOSIS — N95.0 PMB (POSTMENOPAUSAL BLEEDING): Primary | ICD-10-CM

## 2021-01-19 LAB
ABSOLUTE EOS #: 0.16 K/UL (ref 0–0.44)
ABSOLUTE IMMATURE GRANULOCYTE: 0.03 K/UL (ref 0–0.3)
ABSOLUTE LYMPH #: 2.19 K/UL (ref 1.1–3.7)
ABSOLUTE MONO #: 0.44 K/UL (ref 0.1–1.2)
ANION GAP SERPL CALCULATED.3IONS-SCNC: 11 MMOL/L (ref 9–17)
BASOPHILS # BLD: 0 % (ref 0–2)
BASOPHILS ABSOLUTE: <0.03 K/UL (ref 0–0.2)
BUN BLDV-MCNC: 8 MG/DL (ref 6–20)
BUN/CREAT BLD: NORMAL (ref 9–20)
CALCIUM SERPL-MCNC: 9.4 MG/DL (ref 8.6–10.4)
CHLORIDE BLD-SCNC: 102 MMOL/L (ref 98–107)
CO2: 23 MMOL/L (ref 20–31)
CREAT SERPL-MCNC: 0.61 MG/DL (ref 0.5–0.9)
DIFFERENTIAL TYPE: ABNORMAL
EOSINOPHILS RELATIVE PERCENT: 3 % (ref 1–4)
GFR AFRICAN AMERICAN: >60 ML/MIN
GFR NON-AFRICAN AMERICAN: >60 ML/MIN
GFR SERPL CREATININE-BSD FRML MDRD: NORMAL ML/MIN/{1.73_M2}
GFR SERPL CREATININE-BSD FRML MDRD: NORMAL ML/MIN/{1.73_M2}
GLUCOSE BLD-MCNC: 84 MG/DL (ref 70–99)
HCT VFR BLD CALC: 41.8 % (ref 36.3–47.1)
HEMOGLOBIN: 12.7 G/DL (ref 11.9–15.1)
IMMATURE GRANULOCYTES: 1 %
LYMPHOCYTES # BLD: 44 % (ref 24–43)
MAGNESIUM: 2.2 MG/DL (ref 1.6–2.6)
MCH RBC QN AUTO: 26.6 PG (ref 25.2–33.5)
MCHC RBC AUTO-ENTMCNC: 30.4 G/DL (ref 28.4–34.8)
MCV RBC AUTO: 87.4 FL (ref 82.6–102.9)
MONOCYTES # BLD: 9 % (ref 3–12)
NRBC AUTOMATED: 0 PER 100 WBC
PDW BLD-RTO: 13.1 % (ref 11.8–14.4)
PLATELET # BLD: 263 K/UL (ref 138–453)
PLATELET ESTIMATE: ABNORMAL
PMV BLD AUTO: 11.3 FL (ref 8.1–13.5)
POTASSIUM SERPL-SCNC: 4.2 MMOL/L (ref 3.7–5.3)
RBC # BLD: 4.78 M/UL (ref 3.95–5.11)
RBC # BLD: ABNORMAL 10*6/UL
SEG NEUTROPHILS: 43 % (ref 36–65)
SEGMENTED NEUTROPHILS ABSOLUTE COUNT: 2.14 K/UL (ref 1.5–8.1)
SODIUM BLD-SCNC: 136 MMOL/L (ref 135–144)
TSH SERPL DL<=0.05 MIU/L-ACNC: 0.72 MIU/L (ref 0.3–5)
WBC # BLD: 5 K/UL (ref 3.5–11.3)
WBC # BLD: ABNORMAL 10*3/UL

## 2021-01-19 PROCEDURE — 76830 TRANSVAGINAL US NON-OB: CPT | Performed by: OBSTETRICS & GYNECOLOGY

## 2021-02-20 ENCOUNTER — HOSPITAL ENCOUNTER (OUTPATIENT)
Age: 56
Discharge: HOME | End: 2021-02-22
Payer: COMMERCIAL

## 2021-02-20 ENCOUNTER — HOSPITAL ENCOUNTER (OUTPATIENT)
Dept: MAMMOGRAPHY | Age: 56
Discharge: HOME | End: 2021-02-22
Payer: COMMERCIAL

## 2021-02-20 DIAGNOSIS — Z12.31 ENCOUNTER FOR SCREENING MAMMOGRAM FOR MALIGNANT NEOPLASM OF BREAST: ICD-10-CM

## 2021-02-20 PROCEDURE — 77063 BREAST TOMOSYNTHESIS BI: CPT

## 2021-04-01 ENCOUNTER — NURSE TRIAGE (OUTPATIENT)
Dept: OTHER | Facility: CLINIC | Age: 56
End: 2021-04-01

## 2021-04-01 NOTE — TELEPHONE ENCOUNTER
Reason for Disposition   MILD rectal bleeding (more than just a few drops or streaks)    Answer Assessment - Initial Assessment Questions  1. APPEARANCE of BLOOD: \"What color is it? \" \"Is it passed separately, on the surface of the stool, or mixed in with the stool? \"        A little bit this morning of blood on tissue and then more this afternoon when using the restroom again     2. AMOUNT: \"How much blood was passed? \"          Blood on tissue     3. FREQUENCY: \"How many times has blood been passed with the stools? \"          Twice today     4. ONSET: \"When was the blood first seen in the stools? \" (Days or weeks)           Today     5. DIARRHEA: \"Is there also some diarrhea? \" If so, ask: \"How many diarrhea stools were passed in past 24 hours? \"             Denies     6. CONSTIPATION: \"Do you have constipation? \" If so, \"How bad is it? \"           Pt had to strain this afternoon to have a BM in the beginning and then it was okay. 7. RECURRENT SYMPTOMS: \"Have you had blood in your stools before? \" If so, ask: \"When was the last time? \" and \"What happened that time? \"             Denies but does have a hx of hemorrhoid     8. BLOOD THINNERS: \"Do you take any blood thinners? \" (e.g., Coumadin/warfarin, Pradaxa/dabigatran, aspirin)           Denies                9. OTHER SYMPTOMS: \"Do you have any other symptoms? \"  (e.g., abdominal pain, vomiting, dizziness, fever)            Denies     10. PREGNANCY: \"Is there any chance you are pregnant? \" \"When was your last menstrual period? \"    Protocols used: RECTAL BLEEDING-ADULT-[place]    Received call from Ann Marie  at pre-service center [facility] with [employer] [facility]. Brief description of triage: see above     Triage indicates for patient to be seen in next 3 days for mild rectal bleeding that started today.      Care advice provided, patient verbalizes understanding; denies any other questions or concerns; instructed to call back for any new or worsening symptoms. Writer provided warm transfer to [place] at [facility] for appointment scheduling. Attention Provider: Thank you for allowing me to participate in the care of your patient. The patient was connected to triage in response to information provided to the E[facility]. Please do not respond through this encounter as the response is not directed to a shared pool.

## 2021-04-02 ENCOUNTER — OFFICE VISIT (OUTPATIENT)
Dept: FAMILY MEDICINE CLINIC | Age: 56
End: 2021-04-02
Payer: COMMERCIAL

## 2021-04-02 VITALS
HEART RATE: 74 BPM | BODY MASS INDEX: 36.47 KG/M2 | SYSTOLIC BLOOD PRESSURE: 122 MMHG | DIASTOLIC BLOOD PRESSURE: 84 MMHG | OXYGEN SATURATION: 98 % | TEMPERATURE: 97.3 F | WEIGHT: 193 LBS

## 2021-04-02 DIAGNOSIS — K62.5 RECTAL BLEEDING: Primary | ICD-10-CM

## 2021-04-02 PROCEDURE — 99213 OFFICE O/P EST LOW 20 MIN: CPT | Performed by: FAMILY MEDICINE

## 2021-04-02 NOTE — PROGRESS NOTES
Progress Note    Darrell Valerio is a 63 y.o.  female who presents today alone for evaluation of   Chief Complaint   Patient presents with    Rectal Bleeding     X 2 yesterday with BM           HPI:   Patient is here today for rectal bleeding. Patient states she had two episodes of BRBPR. Patient states she had only noticed blood on the TP. Patient states she is frequently constipated. Patient states she is otherwise well. Patient denies cp/sob/le edema/dizziness/lightheadedness/blurry va/ha. Patient denies PND/orthopnea. Patient denies n/v/d. Patient denies abd pain. Patient states she has never had a colonoscopy. Health Maintenance Due   Topic Date Due    Hepatitis C screen  Never done    HIV screen  Never done    COVID-19 Vaccine (1) Never done    Colon Cancer Screen FIT/FOBT  2018        Current Medications:     Current Outpatient Medications   Medication Sig Dispense Refill    Turmeric (QC TUMERIC COMPLEX) 500 MG CAPS Take by mouth      Ascorbic Acid (TREVOR-C PO) Take by mouth      tiZANidine (ZANAFLEX) 2 MG tablet Take 1 tablet by mouth 3 times daily as needed (pain) 30 tablet 0    ibuprofen (ADVIL;MOTRIN) 800 MG tablet Take 1 tablet by mouth every 8 hours as needed for Pain 30 tablet 0    Multiple Vitamins-Minerals (THERAPEUTIC MULTIVITAMIN-MINERALS) tablet Take 1 tablet by mouth daily. No current facility-administered medications for this visit. Allergies:   No Known Allergies     Medical History:     Past Medical History:   Diagnosis Date    Calcium kidney stone     history of.     Depression     Hematuria     Obesity (BMI 35.0-39.9 without comorbidity) 3/30/2016    Sleep apnea        Past Surgical History:   Procedure Laterality Date     SECTION      x3    FOOT SURGERY      left foot has a pin    TONSILLECTOMY AND ADENOIDECTOMY         Family History   Problem Relation Age of Onset    Kidney Disease Mother     High Blood Pressure Mother    Thiregan Bealconi Other Father         dementia    High Blood Pressure Father     High Blood Pressure Sister     Other Sister         Sleep Apena    High Blood Pressure Brother     Other Brother         Sleep Apnea    High Blood Pressure Sister     Other Sister         Sleep Apnea        Social History:     Social History     Socioeconomic History    Marital status:      Spouse name: Not on file    Number of children: Not on file    Years of education: Not on file    Highest education level: Not on file   Occupational History    Not on file   Social Needs    Financial resource strain: Not on file    Food insecurity     Worry: Not on file     Inability: Not on file    Transportation needs     Medical: Not on file     Non-medical: Not on file   Tobacco Use    Smoking status: Never Smoker    Smokeless tobacco: Never Used   Substance and Sexual Activity    Alcohol use: No     Alcohol/week: 0.0 standard drinks    Drug use: No    Sexual activity: Yes   Lifestyle    Physical activity     Days per week: Not on file     Minutes per session: Not on file    Stress: Not on file   Relationships    Social connections     Talks on phone: Not on file     Gets together: Not on file     Attends Yazdanism service: Not on file     Active member of club or organization: Not on file     Attends meetings of clubs or organizations: Not on file     Relationship status: Not on file    Intimate partner violence     Fear of current or ex partner: Not on file     Emotionally abused: Not on file     Physically abused: Not on file     Forced sexual activity: Not on file   Other Topics Concern    Not on file   Social History Narrative    Not on file        ROS:     Constitutional: No fevers, chills, fatigue. ENT: No nasal congestion or sore throat  Respiratory: No difficulty in breathing or cough. Cardiovascular: No chest pain, palpitations or shortness of breath  Gastrointestinal: No abdominal pain or change in bowel movements. +BRBPR  Genitourinary: No change in urinary frequency or dysuria. Skin: No rashes or skin lesions. Neurological: No weakness. No headaches. Last Filed Vitals:  /84   Pulse 74   Temp 97.3 °F (36.3 °C) (Temporal)   Wt 193 lb (87.5 kg)   SpO2 98%   BMI 36.47 kg/m²      Physical Examination:     GENERAL APPEARANCE: in no acute distress, well developed, well nourished. HEAD: normocephalic, atraumatic. EYES: extraocular movement intact (EOMI), pupils equal, round, reactive to light and accommodation. EARS: normal, tympanic membrane intact, clear, auditory canal clear. NOSE: nares patent, no erythema, sinuses nontender bilaterally, no rhinorrhea. ORAL CAVITY: mucosa moist, no lesions. THROAT: clear, no mass, no exudate. NECK/THYROID: neck supple, full range of motion, no thyromegaly. HEART: no murmurs, regular rate and rhythm, S1, S2 normal.   LUNGS: clear to auscultation bilaterally, no wheezes, rales, rhonchi.    ABDOMEN: normal, bowel sounds present, soft, nontender, nondistended, no rebound guarding or rigidity    Recent Labs/ In Office Testing/ Radiograph review:     Hospital Outpatient Visit on 01/19/2021   Component Date Value Ref Range Status    TSH 01/19/2021 0.72  0.30 - 5.00 mIU/L Final    Magnesium 01/19/2021 2.2  1.6 - 2.6 mg/dL Final    Glucose 01/19/2021 84  70 - 99 mg/dL Final    BUN 01/19/2021 8  6 - 20 mg/dL Final    CREATININE 01/19/2021 0.61  0.50 - 0.90 mg/dL Final    Bun/Cre Ratio 01/19/2021 NOT REPORTED  9 - 20 Final    Calcium 01/19/2021 9.4  8.6 - 10.4 mg/dL Final    Sodium 01/19/2021 136  135 - 144 mmol/L Final    Potassium 01/19/2021 4.2  3.7 - 5.3 mmol/L Final    Chloride 01/19/2021 102  98 - 107 mmol/L Final    CO2 01/19/2021 23  20 - 31 mmol/L Final    Anion Gap 01/19/2021 11  9 - 17 mmol/L Final    GFR Non- 01/19/2021 >60  >60 mL/min Final    GFR  01/19/2021 >60  >60 mL/min Final    GFR Comment 01/19/2021 Final    Comment: Average GFR for 52-63 years old:   80 mL/min/1.73sq m  Chronic Kidney Disease:   <60 mL/min/1.73sq m  Kidney failure:   <15 mL/min/1.73sq m              eGFR calculated using average adult body mass. Additional eGFR calculator available at:        [employer]            GFR Staging 01/19/2021 NOT REPORTED   Final    WBC 01/19/2021 5.0  3.5 - 11.3 k/uL Final    RBC 01/19/2021 4.78  3.95 - 5.11 m/uL Final    Hemoglobin 01/19/2021 12.7  11.9 - 15.1 g/dL Final    Hematocrit 01/19/2021 41.8  36.3 - 47.1 % Final    MCV 01/19/2021 87.4  82.6 - 102.9 fL Final    MCH 01/19/2021 26.6  25.2 - 33.5 pg Final    MCHC 01/19/2021 30.4  28.4 - 34.8 g/dL Final    RDW 01/19/2021 13.1  11.8 - 14.4 % Final    Platelets 08/27/1744 263  138 - 453 k/uL Final    MPV 01/19/2021 11.3  8.1 - 13.5 fL Final    NRBC Automated 01/19/2021 0.0  0.0 per 100 WBC Final    Differential Type 01/19/2021 NOT REPORTED   Final    Seg Neutrophils 01/19/2021 43  36 - 65 % Final    Lymphocytes 01/19/2021 44* 24 - 43 % Final    Monocytes 01/19/2021 9  3 - 12 % Final    Eosinophils % 01/19/2021 3  1 - 4 % Final    Basophils 01/19/2021 0  0 - 2 % Final    Immature Granulocytes 01/19/2021 1* 0 % Final    Segs Absolute 01/19/2021 2.14  1.50 - 8.10 k/uL Final    Absolute Lymph # 01/19/2021 2.19  1.10 - 3.70 k/uL Final    Absolute Mono # 01/19/2021 0.44  0.10 - 1.20 k/uL Final    Absolute Eos # 01/19/2021 0.16  0.00 - 0.44 k/uL Final    Basophils Absolute 01/19/2021 <0.03  0.00 - 0.20 k/uL Final    Absolute Immature Granulocyte 01/19/2021 0.03  0.00 - 0.30 k/uL Final    WBC Morphology 01/19/2021 NOT REPORTED   Final    RBC Morphology 01/19/2021 NOT REPORTED   Final    Platelet Estimate 88/94/7738 NOT REPORTED   Final       No results found for this visit on 04/02/21. Assessment/Plan:     Fritz Mondragon was seen today for rectal bleeding.     Diagnoses and all orders for this visit: Rectal bleeding  -     CBC Auto Differential; Future  -     Alex Delgado MD, Gastroenterology, [address]    Follow up on labs. Referral for colonoscopy. All questions answered and addressed to patient satisfaction. Patient understands and agrees to the plan. The patient was evaluated and treated today based on the osteopathic principle that each person is a unit of body, mind, and spirit, the body is capable of self-regulation, self-healing, and health maintenance and that structure and function are reciprocally interrelated. Follow-up:   Return in about 1 month (around 5/2/2021) for f/u JS .       Christy Ellis D.O.

## 2021-04-15 ENCOUNTER — HOSPITAL ENCOUNTER (OUTPATIENT)
Age: 56
Setting detail: SPECIMEN
Discharge: HOME | End: 2021-04-15
Payer: COMMERCIAL

## 2021-04-15 DIAGNOSIS — K62.5 RECTAL BLEEDING: ICD-10-CM

## 2021-04-15 LAB
ABSOLUTE EOS #: 0.26 K/UL (ref 0–0.44)
ABSOLUTE IMMATURE GRANULOCYTE: <0.03 K/UL (ref 0–0.3)
ABSOLUTE LYMPH #: 2.76 K/UL (ref 1.1–3.7)
ABSOLUTE MONO #: 0.54 K/UL (ref 0.1–1.2)
BASOPHILS # BLD: 1 % (ref 0–2)
BASOPHILS ABSOLUTE: 0.03 K/UL (ref 0–0.2)
DIFFERENTIAL TYPE: ABNORMAL
EOSINOPHILS RELATIVE PERCENT: 5 % (ref 1–4)
HCT VFR BLD CALC: 40 % (ref 36.3–47.1)
HEMOGLOBIN: 12.4 G/DL (ref 11.9–15.1)
IMMATURE GRANULOCYTES: 0 %
LYMPHOCYTES # BLD: 54 % (ref 24–43)
MCH RBC QN AUTO: 27.6 PG (ref 25.2–33.5)
MCHC RBC AUTO-ENTMCNC: 31 G/DL (ref 28.4–34.8)
MCV RBC AUTO: 88.9 FL (ref 82.6–102.9)
MONOCYTES # BLD: 11 % (ref 3–12)
NRBC AUTOMATED: 0 PER 100 WBC
PDW BLD-RTO: 13 % (ref 11.8–14.4)
PLATELET # BLD: 235 K/UL (ref 138–453)
PLATELET ESTIMATE: ABNORMAL
PMV BLD AUTO: 11.3 FL (ref 8.1–13.5)
RBC # BLD: 4.5 M/UL (ref 3.95–5.11)
RBC # BLD: ABNORMAL 10*6/UL
SEG NEUTROPHILS: 29 % (ref 36–65)
SEGMENTED NEUTROPHILS ABSOLUTE COUNT: 1.47 K/UL (ref 1.5–8.1)
WBC # BLD: 5.1 K/UL (ref 3.5–11.3)
WBC # BLD: ABNORMAL 10*3/UL

## 2021-05-03 ENCOUNTER — TELEPHONE (OUTPATIENT)
Dept: GASTROENTEROLOGY | Age: 56
End: 2021-05-03

## 2021-05-03 ENCOUNTER — OFFICE VISIT (OUTPATIENT)
Dept: FAMILY MEDICINE CLINIC | Age: 56
End: 2021-05-03
Payer: COMMERCIAL

## 2021-05-03 VITALS
DIASTOLIC BLOOD PRESSURE: 80 MMHG | BODY MASS INDEX: 36.84 KG/M2 | HEART RATE: 90 BPM | WEIGHT: 195 LBS | OXYGEN SATURATION: 97 % | SYSTOLIC BLOOD PRESSURE: 110 MMHG | TEMPERATURE: 97.9 F

## 2021-05-03 DIAGNOSIS — Z09 FOLLOW-UP EXAM: Primary | ICD-10-CM

## 2021-05-03 DIAGNOSIS — K59.00 CONSTIPATION, UNSPECIFIED CONSTIPATION TYPE: ICD-10-CM

## 2021-05-03 DIAGNOSIS — Z12.11 COLON CANCER SCREENING: Primary | ICD-10-CM

## 2021-05-03 DIAGNOSIS — K62.5 RECTAL BLEEDING: ICD-10-CM

## 2021-05-03 PROCEDURE — 99213 OFFICE O/P EST LOW 20 MIN: CPT | Performed by: NURSE PRACTITIONER

## 2021-05-03 RX ORDER — BISACODYL 5 MG
TABLET, DELAYED RELEASE (ENTERIC COATED) ORAL
Qty: 4 TABLET | Refills: 0 | Status: SHIPPED | OUTPATIENT
Start: 2021-05-03 | End: 2022-02-07

## 2021-05-03 RX ORDER — POLYETHYLENE GLYCOL 3350 17 G/17G
POWDER, FOR SOLUTION ORAL
Qty: 238 G | Refills: 0 | Status: SHIPPED | OUTPATIENT
Start: 2021-05-03 | End: 2022-02-07

## 2021-05-03 NOTE — PROGRESS NOTES
[facility]  [id number] [place] [place], [place] [address]  [phone]  [phone] [address] Her[address] is a 63 y.o. female who is here with c/o of:    Chief Complaint: 1 Month Follow-Up and Rectal Bleeding (doing better)      Patient Accompanied by: n/a    HPI - [address] Herrera is here today with c/o:    Patient here for follow up rectal bleeding. Reports that since her last visit she has not had anymore rectal bleeding. Denies any abdominal pain, diarrhea, nausea or vomiting. Says occasionally she gets constipation, depending on her diet. CBC that she recently had done was WNL. She has not scheduled with GI as she was waiting for them to call for an appointment. Health Maintenance Due   Topic Date Due    Colon Cancer Screen FIT/FOBT  2018        Patient Active Problem List:     Calcium kidney stone     Hematuria     Upper respiratory infection     Chest pain     Depression     JESSE on CPAP     Obesity (BMI 35.0-39.9 without comorbidity)     Past Medical History:   Diagnosis Date    Calcium kidney stone     history of.     Depression     Hematuria     Obesity (BMI 35.0-39.9 without comorbidity) 3/30/2016    Sleep apnea       Past Surgical History:   Procedure Laterality Date     SECTION      x3    FOOT SURGERY      left foot has a pin    TONSILLECTOMY AND ADENOIDECTOMY       Family History   Problem Relation Age of Onset    Kidney Disease Mother     High Blood Pressure Mother     Other Father         dementia    High Blood Pressure Father     High Blood Pressure Sister     Other Sister         Sleep Apena    High Blood Pressure Brother     Other Brother         Sleep Apnea    High Blood Pressure Sister     Other Sister         Sleep Apnea     Social History     Tobacco Use    Smoking status: Never Smoker    Smokeless tobacco: Never Used   Substance Use Topics    Alcohol use: No     Alcohol/week: 0.0 standard drinks     ALLERGIES:  No Known Allergies       Subjective   Review of Systems   · Constitutional:  Negative for activity change, appetite change,unexpected weight change, chills, fever, and fatigue. · HENT: Negative for ear pain, sore throat,  Rhinorrhea, sinus pain, sinus pressure, congestion. · Eyes:  Negative for pain and discharge. · Respiratory:  Negative for chest tightness, shortness of breath, wheezing, and cough. · Cardiovascular:  Negative for chest pain, palpitations and leg swelling. · Gastrointestinal: Negative for abdominal pain, blood in stool, constipation,diarrhea, nausea and vomiting. · Endocrine: Negative for cold intolerance, heat intolerance, polydipsia, polyphagia and polyuria. · Genitourinary: Negative for difficulty urinating, dysuria, flank pain, frequency, hematuria and urgency. · Musculoskeletal: Negative for arthralgias, back pain, joint swelling, myalgias, neck pain and neck stiffness. · Skin: Negative for rash and wound. · Allergic/Immunologic: Negative for environmental allergies and food allergies. · Neurological:  Negative for dizziness, light-headedness, numbness and headaches. · Hematological:  Negative for adenopathy. Does not bruise/bleed easily. · Psychiatric/Behavioral: Negative for self-injury, sleep disturbance and suicidal ideas. Objective   Physical Exam   PHYSICAL EXAM:   · Constitutional: Cherelle Carmona is oriented to person, place, and time. Vital signs are normal. Appears well-developed and well-nourished. She is obese  · Head: Normocephalic and atraumatic. Eyes:PERRL, EOMI, Conjunctiva normal, No discharge. · Neck: Full passive range of motion. Non-tender on palpation. Neck supple. No thyromegaly present. Trachea normal.  · Cardiovascular: Normal rate, regular rhythm, S1, S2, no murmur, no gallop, no friction rub, intact distal pulses. · Pulmonary/Chest: Breath sounds are clear throughout, No respiratory distress, No wheezing, No chest tenderness.  Effort normal · Abdominal: Soft. Normal appearance, bowel sounds are present and normoactive. There is no hepatosplenomegaly. There is no tenderness. There is no CVA tenderness. · Neurological: Alert and oriented to person, place, and time. Normal motor function, Normal sensory function, No focal deficits noted. He has normal strength. · Skin: Skin is warm, dry and intact. No obvious lesions on exposed skin  · Psychiatric: Normal mood and affect. Speech is normal and behavior is normal.     Nursing note and vitals reviewed. Blood pressure 110/80, pulse 90, temperature 97.9 °F (36.6 °C), temperature source Temporal, weight 195 lb (88.5 kg), SpO2 97 %, not currently breastfeeding. Body mass index is 36.84 kg/m².     Wt Readings from Last 3 Encounters:   05/03/21 195 lb (88.5 kg)   04/02/21 193 lb (87.5 kg)   01/07/21 187 lb (84.8 kg)     BP Readings from Last 3 Encounters:   05/03/21 110/80   04/02/21 122/84   01/07/21 120/72       Hospital Outpatient Visit on 04/15/2021   Component Date Value Ref Range Status    WBC 04/15/2021 5.1  3.5 - 11.3 k/uL Final    RBC 04/15/2021 4.50  3.95 - 5.11 m/uL Final    Hemoglobin 04/15/2021 12.4  11.9 - 15.1 g/dL Final    Hematocrit 04/15/2021 40.0  36.3 - 47.1 % Final    MCV 04/15/2021 88.9  82.6 - 102.9 fL Final    MCH 04/15/2021 27.6  25.2 - 33.5 pg Final    MCHC 04/15/2021 31.0  28.4 - 34.8 g/dL Final    RDW 04/15/2021 13.0  11.8 - 14.4 % Final    Platelets 20/96/1968 235  138 - 453 k/uL Final    MPV 04/15/2021 11.3  8.1 - 13.5 fL Final    NRBC Automated 04/15/2021 0.0  0.0 per 100 WBC Final    Differential Type 04/15/2021 NOT REPORTED   Final    Seg Neutrophils 04/15/2021 29* 36 - 65 % Final    Lymphocytes 04/15/2021 54* 24 - 43 % Final    Monocytes 04/15/2021 11  3 - 12 % Final    Eosinophils % 04/15/2021 5* 1 - 4 % Final    Basophils 04/15/2021 1  0 - 2 % Final    Immature Granulocytes 04/15/2021 0  0 % Final    Segs Absolute 04/15/2021 1.47* 1.50 - 8.10 k/uL Final answering all questions.           Signed:  Halie Wheatley, CNP

## 2021-05-03 NOTE — TELEPHONE ENCOUNTER
Pt called to schedule from referral. Pt scheduled for colon screen on 6/16/21 with Dr Catherine Del Angel at [address] at [facility]. Covid test on 6/12/21 at 310pm at [facility], PAT call on 6/7/21 at 145pm. Miralax/mag prep given to pt over phone and mailed to home address.

## 2021-06-07 ENCOUNTER — HOSPITAL ENCOUNTER (OUTPATIENT)
Dept: PREADMISSION TESTING | Age: 56
Discharge: HOME | End: 2021-06-11
Payer: COMMERCIAL

## 2021-06-07 VITALS — BODY MASS INDEX: 33.99 KG/M2 | WEIGHT: 180 LBS | HEIGHT: 61 IN

## 2021-06-07 NOTE — PROGRESS NOTES
the doctor should speak with them after your surgery. · After surgery, you will be taken to the recovery room then when you are awake and stable you will go to the short stay unit for preparation to be discharged. Only your one designated person is allowed to come to short stay for your discharge. · If you use a Bi-PAP or C-PAP machine, please bring it with you and leave it in the car in case it is needed in recovery room. Instructions read to pt and understanding verbalized.

## 2021-06-12 ENCOUNTER — HOSPITAL ENCOUNTER (OUTPATIENT)
Dept: LAB | Age: 56
Setting detail: SPECIMEN
Discharge: HOME | End: 2021-06-12
Payer: COMMERCIAL

## 2021-06-12 DIAGNOSIS — Z01.818 PRE-OP TESTING: Primary | ICD-10-CM

## 2021-06-12 PROCEDURE — U0003 INFECTIOUS AGENT DETECTION BY NUCLEIC ACID (DNA OR RNA); SEVERE ACUTE RESPIRATORY SYNDROME CORONAVIRUS 2 (SARS-COV-2) (CORONAVIRUS DISEASE [COVID-19]), AMPLIFIED PROBE TECHNIQUE, MAKING USE OF HIGH THROUGHPUT TECHNOLOGIES AS DESCRIBED BY CMS-2020-01-R: HCPCS

## 2021-06-12 PROCEDURE — U0005 INFEC AGEN DETEC AMPLI PROBE: HCPCS

## 2021-06-13 LAB
SARS-COV-2: NORMAL
SARS-COV-2: NOT DETECTED
SOURCE: NORMAL

## 2021-06-15 ENCOUNTER — ANESTHESIA EVENT (OUTPATIENT)
Dept: ENDOSCOPY | Age: 56
End: 2021-06-15
Payer: COMMERCIAL

## 2021-06-16 ENCOUNTER — ANESTHESIA (OUTPATIENT)
Dept: ENDOSCOPY | Age: 56
End: 2021-06-16
Payer: COMMERCIAL

## 2021-06-16 ENCOUNTER — HOSPITAL ENCOUNTER (OUTPATIENT)
Age: 56
Setting detail: OUTPATIENT SURGERY
Discharge: HOME | End: 2021-06-16
Attending: INTERNAL MEDICINE | Admitting: INTERNAL MEDICINE
Payer: COMMERCIAL

## 2021-06-16 VITALS
BODY MASS INDEX: 34.74 KG/M2 | HEART RATE: 70 BPM | OXYGEN SATURATION: 99 % | RESPIRATION RATE: 16 BRPM | WEIGHT: 184 LBS | TEMPERATURE: 96.7 F | SYSTOLIC BLOOD PRESSURE: 133 MMHG | HEIGHT: 61 IN | DIASTOLIC BLOOD PRESSURE: 80 MMHG

## 2021-06-16 VITALS — SYSTOLIC BLOOD PRESSURE: 124 MMHG | DIASTOLIC BLOOD PRESSURE: 78 MMHG | OXYGEN SATURATION: 99 %

## 2021-06-16 PROCEDURE — 7100000030 HC ASPR PHASE II RECOVERY - FIRST 15 MIN: Performed by: INTERNAL MEDICINE

## 2021-06-16 PROCEDURE — 2580000003 HC RX 258: Performed by: ANESTHESIOLOGY

## 2021-06-16 PROCEDURE — 7100000001 HC PACU RECOVERY - ADDTL 15 MIN: Performed by: INTERNAL MEDICINE

## 2021-06-16 PROCEDURE — 3700000000 HC ANESTHESIA ATTENDED CARE: Performed by: INTERNAL MEDICINE

## 2021-06-16 PROCEDURE — 7100000031 HC ASPR PHASE II RECOVERY - ADDTL 15 MIN: Performed by: INTERNAL MEDICINE

## 2021-06-16 PROCEDURE — 7100000000 HC PACU RECOVERY - FIRST 15 MIN: Performed by: INTERNAL MEDICINE

## 2021-06-16 PROCEDURE — 45385 COLONOSCOPY W/LESION REMOVAL: CPT | Performed by: INTERNAL MEDICINE

## 2021-06-16 PROCEDURE — 7100000011 HC PHASE II RECOVERY - ADDTL 15 MIN: Performed by: INTERNAL MEDICINE

## 2021-06-16 PROCEDURE — 3609010300 HC COLONOSCOPY W/BIOPSY SINGLE/MULTIPLE: Performed by: INTERNAL MEDICINE

## 2021-06-16 PROCEDURE — 88305 TISSUE EXAM BY PATHOLOGIST: CPT

## 2021-06-16 PROCEDURE — 2709999900 HC NON-CHARGEABLE SUPPLY: Performed by: INTERNAL MEDICINE

## 2021-06-16 PROCEDURE — 3700000001 HC ADD 15 MINUTES (ANESTHESIA): Performed by: INTERNAL MEDICINE

## 2021-06-16 PROCEDURE — 7100000010 HC PHASE II RECOVERY - FIRST 15 MIN: Performed by: INTERNAL MEDICINE

## 2021-06-16 PROCEDURE — 6360000002 HC RX W HCPCS: Performed by: NURSE ANESTHETIST, CERTIFIED REGISTERED

## 2021-06-16 RX ORDER — LIDOCAINE HYDROCHLORIDE 10 MG/ML
1 INJECTION, SOLUTION EPIDURAL; INFILTRATION; INTRACAUDAL; PERINEURAL
Status: DISCONTINUED | OUTPATIENT
Start: 2021-06-16 | End: 2021-06-16 | Stop reason: HOSPADM

## 2021-06-16 RX ORDER — MAGNESIUM 30 MG
30 TABLET ORAL NIGHTLY PRN
COMMUNITY

## 2021-06-16 RX ORDER — PROPOFOL 10 MG/ML
INJECTION, EMULSION INTRAVENOUS PRN
Status: DISCONTINUED | OUTPATIENT
Start: 2021-06-16 | End: 2021-06-16 | Stop reason: SDUPTHER

## 2021-06-16 RX ORDER — PROPOFOL 10 MG/ML
INJECTION, EMULSION INTRAVENOUS CONTINUOUS PRN
Status: DISCONTINUED | OUTPATIENT
Start: 2021-06-16 | End: 2021-06-16 | Stop reason: SDUPTHER

## 2021-06-16 RX ORDER — SODIUM CHLORIDE 0.9 % (FLUSH) 0.9 %
10 SYRINGE (ML) INJECTION EVERY 12 HOURS SCHEDULED
Status: DISCONTINUED | OUTPATIENT
Start: 2021-06-16 | End: 2021-06-16 | Stop reason: HOSPADM

## 2021-06-16 RX ORDER — SODIUM CHLORIDE 9 MG/ML
25 INJECTION, SOLUTION INTRAVENOUS PRN
Status: DISCONTINUED | OUTPATIENT
Start: 2021-06-16 | End: 2021-06-16 | Stop reason: HOSPADM

## 2021-06-16 RX ORDER — SODIUM CHLORIDE, SODIUM LACTATE, POTASSIUM CHLORIDE, CALCIUM CHLORIDE 600; 310; 30; 20 MG/100ML; MG/100ML; MG/100ML; MG/100ML
INJECTION, SOLUTION INTRAVENOUS CONTINUOUS
Status: DISCONTINUED | OUTPATIENT
Start: 2021-06-16 | End: 2021-06-16 | Stop reason: HOSPADM

## 2021-06-16 RX ORDER — SODIUM CHLORIDE 0.9 % (FLUSH) 0.9 %
10 SYRINGE (ML) INJECTION PRN
Status: DISCONTINUED | OUTPATIENT
Start: 2021-06-16 | End: 2021-06-16 | Stop reason: HOSPADM

## 2021-06-16 RX ADMIN — PROPOFOL 40 MG: 10 INJECTION, EMULSION INTRAVENOUS at 11:29

## 2021-06-16 RX ADMIN — SODIUM CHLORIDE, POTASSIUM CHLORIDE, SODIUM LACTATE AND CALCIUM CHLORIDE: 600; 310; 30; 20 INJECTION, SOLUTION INTRAVENOUS at 09:31

## 2021-06-16 RX ADMIN — PROPOFOL 150 MCG/KG/MIN: 10 INJECTION, EMULSION INTRAVENOUS at 11:22

## 2021-06-16 RX ADMIN — PROPOFOL 80 MG: 10 INJECTION, EMULSION INTRAVENOUS at 11:22

## 2021-06-16 ASSESSMENT — PULMONARY FUNCTION TESTS
PIF_VALUE: 1
PIF_VALUE: 2
PIF_VALUE: 2
PIF_VALUE: 1
PIF_VALUE: 0
PIF_VALUE: 1
PIF_VALUE: 0
PIF_VALUE: 1
PIF_VALUE: 1
PIF_VALUE: 2
PIF_VALUE: 1

## 2021-06-16 ASSESSMENT — PAIN - FUNCTIONAL ASSESSMENT: PAIN_FUNCTIONAL_ASSESSMENT: 0-10

## 2021-06-16 ASSESSMENT — ENCOUNTER SYMPTOMS: STRIDOR: 0

## 2021-06-16 ASSESSMENT — PAIN SCALES - GENERAL: PAINLEVEL_OUTOF10: 0

## 2021-06-16 NOTE — H&P
HISTORY and Treinta GOLDY Lopez [address]       NAME:  Edouard Montana  MRN: [id number]   YOB: 1965   Date: 2021   Age: 61 y.o. Gender: female       COMPLAINT AND PRESENT HISTORY:     Edouard Montana is 61 y.o., female, having a screening colonoscopy. Pt has not had a previous colonoscopy. Pt has completed a cologuard test about four years ago which was negative. History of hemorrhoids. She reports she does occasionally having bleeding with wiping after BM. Denies abdominal pain, heartburn, nausea, vomiting, diarrhea, constipation, or melena. Pt reports she does have rectal bleeding in small amounts if she eats meats. She does avoid meat in her diet. Denies Family Hx of colon cancer. Completed prep. NPO. No medications taken this am. Stopped multivitamins one week ago. denies taking any other blood thinning medications. Denies chest pain/pressure, palpitations, SOB, recent URI, fever or chills. PAST MEDICAL HISTORY     Past Medical History:   Diagnosis Date    Arthritis     Calcium kidney stone     history of.     Depression     Hematuria     Obesity (BMI 35.0-39.9 without comorbidity) 3/30/2016    Sleep apnea     + Cpap mask       SURGICAL HISTORY       Past Surgical History:   Procedure Laterality Date     SECTION      x3    FOOT SURGERY      left foot has a pin    TONSILLECTOMY AND ADENOIDECTOMY         FAMILY HISTORY       Family History   Problem Relation Age of Onset    Kidney Disease Mother     High Blood Pressure Mother     Other Father         dementia    High Blood Pressure Father     High Blood Pressure Sister     Other Sister         Sleep Apena    High Blood Pressure Brother     Other Brother         Sleep Apnea    High Blood Pressure Sister     Other Sister         Sleep Apnea       SOCIAL HISTORY       Social History     Socioeconomic History    Marital status:      Spouse name: Not on file    Number of children: Not on file    Years of education: Not on file    Highest education level: Not on file   Occupational History    Not on file   Tobacco Use    Smoking status: Never Smoker    Smokeless tobacco: Never Used   Vaping Use    Vaping Use: Never used   Substance and Sexual Activity    Alcohol use: No     Alcohol/week: 0.0 standard drinks    Drug use: No    Sexual activity: Yes   Other Topics Concern    Not on file   Social History Narrative    Not on file     Social Determinants of Health     Financial Resource Strain:     Difficulty of Paying Living Expenses:    Food Insecurity:     Worried About Running Out of Food in the Last Year:     Ran Out of Food in the Last Year:    Transportation Needs:     Lack of Transportation (Medical):  Lack of Transportation (Non-Medical):    Physical Activity:     Days of Exercise per Week:     Minutes of Exercise per Session:    Stress:     Feeling of Stress :    Social Connections:     Frequency of Communication with Friends and Family:     Frequency of Social Gatherings with Friends and Family:     Attends [employer] Services:     Active Member of Clubs or Organizations:     Attends Club or Organization Meetings:     Marital Status:    Intimate Partner Violence:     Fear of Current or Ex-Partner:     Emotionally Abused:     Physically Abused:     Sexually Abused:          REVIEW OF SYSTEMS      No Known Allergies    No current facility-administered medications on file prior to encounter.      Current Outpatient Medications on File Prior to Encounter   Medication Sig Dispense Refill    Turmeric (QC TUMERIC COMPLEX) 500 MG CAPS Take by mouth      Ascorbic Acid (TREVOR-C PO) Take by mouth      tiZANidine (ZANAFLEX) 2 MG tablet Take 1 tablet by mouth 3 times daily as needed (pain) 30 tablet 0    ibuprofen (ADVIL;MOTRIN) 800 MG tablet Take 1 tablet by mouth every 8 hours as needed for Pain 30 tablet 0    Multiple Vitamins-Minerals (THERAPEUTIC MULTIVITAMIN-MINERALS) tablet Take 1 tablet by mouth daily. Notation: Above medications are not currently reconciled at time of signing this H&P note, to be reconciled in pre-op per RN. Review of Systems   All other systems reviewed and are negative. GENERAL PHYSICAL EXAM     Vitals: Review vitals per RN flowsheet. GENERAL APPEARANCE:   Nhi Villasenor is 61 y.o. female, nourished, conscious, alert. Does not appear to be in distress or pain at this time. Physical Exam  Constitutional:       General: She is not in acute distress. Appearance: She is not ill-appearing. HENT:      Head: Normocephalic and atraumatic. Nose: Nose normal. No congestion. Mouth/Throat:      Mouth: Mucous membranes are moist.      Pharynx: Oropharynx is clear. No oropharyngeal exudate or posterior oropharyngeal erythema. Eyes:      General: No scleral icterus. Conjunctiva/sclera: Conjunctivae normal.   Cardiovascular:      Rate and Rhythm: Normal rate and regular rhythm. Heart sounds: Normal heart sounds. No murmur heard. No friction rub. No gallop. Pulmonary:      Effort: Pulmonary effort is normal. No respiratory distress. Breath sounds: Normal breath sounds. No wheezing, rhonchi or rales. Abdominal:      General: Bowel sounds are normal. There is no distension. Palpations: Abdomen is soft. Tenderness: There is no abdominal tenderness. There is no guarding. Musculoskeletal:         General: No swelling or tenderness. Cervical back: Neck supple. No tenderness. Right lower leg: No edema. Left lower leg: No edema. Skin:     General: Skin is warm and dry. Coloration: Skin is not jaundiced. Neurological:      General: No focal deficit present. Mental Status: She is alert and oriented to person, place, and time.       Gait: Gait normal.   Psychiatric:         Mood and Affect: Mood normal.        PROVISIONAL DIAGNOSES / SURGERY:

## 2021-06-16 NOTE — ANESTHESIA PRE PROCEDURE
Department of Anesthesiology  Preprocedure Note       Name:  Zurdo Harmon   Age:  61 y.o.  :  1965                                          MRN:  [id number]         Date:  2021      Surgeon: Alexia Venegas):  True Smith MD    Procedure: Procedure(s):  COLORECTAL CANCER SCREENING, NOT HIGH RISK    Medications prior to admission:   Prior to Admission medications    Medication Sig Start Date End Date Taking? Authorizing Provider   ELDERBERRY PO Take 1 each by mouth   Yes Historical Provider, MD   magnesium 30 MG tablet Take 30 mg by mouth nightly as needed   Yes Historical Provider, MD   polyethylene glycol (GLYCOLAX) 17 GM/SCOOP powder Follow instructions provided to you from physician's office. 5/3/21  Yes Tatianna Hartman MD   bisacodyl (BISACODYL) 5 MG EC tablet Follow instructions provided given by the physician's office. 5/3/21  Yes Tatianna Hartman MD   Ascorbic Acid (TREVOR-C PO) Take by mouth   Yes Historical Provider, MD   Multiple Vitamins-Minerals (THERAPEUTIC MULTIVITAMIN-MINERALS) tablet Take 1 tablet by mouth daily.    Yes Historical Provider, MD   magnesium citrate solution Take 296 mLs by mouth once for 1 dose 5/3/21 6/7/21  Tatianna Hartman MD   Turmeric (QC TUMERIC COMPLEX) 500 MG CAPS Take by mouth    Historical Provider, MD   tiZANidine (ZANAFLEX) 2 MG tablet Take 1 tablet by mouth 3 times daily as needed (pain) 20   BRYANT Veliz - CNP   ibuprofen (ADVIL;MOTRIN) 800 MG tablet Take 1 tablet by mouth every 8 hours as needed for Pain 19   Rene Porras MD       Current medications:    Current Facility-Administered Medications   Medication Dose Route Frequency Provider Last Rate Last Admin    lactated ringers infusion   Intravenous Continuous Eladio Hearn  mL/hr at 21 0931 New Bag at 21 0931    sodium chloride flush 0.9 % injection 10 mL  10 mL Intravenous 2 times per day Eladio Hearn MD        sodium chloride flush 0.9 % injection 10 mL  10 mL Intravenous PRN Tara Bia Arita MD        0.9 % sodium chloride infusion  25 mL Intravenous PRN Janice Ken MD        lidocaine PF 1 % injection 1 mL  1 mL Intradermal Once PRN Janice Ken MD           Allergies:  No Known Allergies    Problem List:    Patient Active Problem List   Diagnosis Code    Calcium kidney stone N20.0    Hematuria R31.9    Upper respiratory infection J06.9    Chest pain R07.9    Depression F32.9    JESSE on CPAP G47.33, Z99.89    Obesity (BMI 35.0-39.9 without comorbidity) E66.9       Past Medical History:        Diagnosis Date    Arthritis     Calcium kidney stone     history of.  Depression     Hematuria     Obesity (BMI 35.0-39.9 without comorbidity) 3/30/2016    Sleep apnea     + Cpap mask       Past Surgical History:        Procedure Laterality Date     SECTION      x3    FOOT SURGERY      left foot has a pin    TONSILLECTOMY AND ADENOIDECTOMY         Social History:    Social History     Tobacco Use    Smoking status: Never Smoker    Smokeless tobacco: Never Used   Substance Use Topics    Alcohol use: No     Alcohol/week: 0.0 standard drinks                                Counseling given: Not Answered      Vital Signs (Current):   Vitals:    21 0916   BP: 128/78   Pulse: 81   Resp: 16   Temp: 97.7 °F (36.5 °C)   TempSrc: Infrared   SpO2: 98%   Weight: 184 lb (83.5 kg)   Height: 5' 1\" (1.549 m)                                              BP Readings from Last 3 Encounters:   21 128/78   21 110/80   21 122/84       NPO Status: Time of last liquid consumption: 2300                        Time of last solid consumption: 1700                        Date of last liquid consumption: 06/15/21                        Date of last solid food consumption: 21    BMI:   Wt Readings from Last 3 Encounters:   21 184 lb (83.5 kg)   21 180 lb (81.6 kg)   21 195 lb (88.5 kg)     Body mass index is 34.77 kg/m².     CBC:   Lab Results   Component GI/Hepatic/Renal ROS  (+) renal disease: kidney stones,           Endo/Other: Negative Endo/Other ROS                    Abdominal:           Vascular:                                        Anesthesia Plan      MAC     ASA 2       Induction: intravenous. Anesthetic plan and risks discussed with patient. Plan discussed with CRNA.                   Yasmin Cross MD   6/16/2021

## 2021-06-17 LAB — SURGICAL PATHOLOGY REPORT: NORMAL

## 2021-06-23 ENCOUNTER — TELEPHONE (OUTPATIENT)
Dept: GASTROENTEROLOGY | Age: 56
End: 2021-06-23

## 2021-06-24 NOTE — OP NOTE
Operative Note  PROCEDURE NOTE    DATE OF PROCEDURE: 6/24/2021    SURGEON: Vance Chambers MD  Facility : [facility]  ASSISTANT: None  Anesthesia: mac   PREOPERATIVE DIAGNOSIS:   Screening     POSTOPERATIVE DIAGNOSIS: as described below    OPERATION: Total colonoscopy     ANESTHESIA: Moderate Sedation    ESTIMATED BLOOD LOSS: less than 50     COMPLICATIONS: None. SPECIMENS:  Was Obtained:   Sigmoid polyp, snared     HISTORY: The patient is a 61y.o. year old female with history of above preop diagnosis. I recommended colonoscopy with possible biopsy or polypectomy and I explained the risk, benefits, expected outcome, and alternatives to the procedure. Risks included but are not limited to bleeding, infection, respiratory distress, hypotension, and perforation of the colon and possibility of missing a lesion. The patient understands and is in agreement. The patient was counseled at length about the risks of evy Covid-19 during their perioperative period and any recovery window from their procedure. The patient was made aware that evy Covid-19  may worsen their prognosis for recovering from their procedure  and lend to a higher morbidity and/or mortality risk. All material risks, benefits, and reasonable alternatives including postponing the procedure were discussed. The patient does wish to proceed with the procedure at this time. PROCEDURE: The patient was given IV conscious sedation. The patient's SPO2 remained above 90% throughout the procedure. The colonoscope was inserted per rectum and advanced under direct vision to the cecum without difficulty. Post sedation note : The patient's SPO2 remained above 90% throughout the procedure. the vital signs remained stable , and no immediate complication form the procedure noted, patient will be ready for d/c when criteria is met . The prep was good.       Findings:  Terminal ileum: normal    Cecum/Ascending colon: normal    Transverse colon: normal    Descending/Sigmoid colon: abnormal: sigmoid polyps , snared cold snare     Rectum/Anus: examined in normal and retroflexed positions and was abnormal: hemorrhoids     Withdrawal Time was (minutes): 10    The colon was decompressed and the scope was removed. The patient tolerated the procedure well. Recommendations/Plan:   1. Lifestyle and dietary modifications as discussed  2. F/U Biopsies  3. F/U In OfficeYes  4. Discussed with the family  5.  Repeat colonoscopy [id number]    Electronically signed by Mikala Willard MD  on 6/24/2021 at 5:00 PM

## 2021-09-07 ENCOUNTER — TELEPHONE (OUTPATIENT)
Dept: FAMILY MEDICINE CLINIC | Age: 56
End: 2021-09-07

## 2021-09-07 ENCOUNTER — OFFICE VISIT (OUTPATIENT)
Dept: FAMILY MEDICINE CLINIC | Age: 56
End: 2021-09-07
Payer: COMMERCIAL

## 2021-09-07 VITALS
TEMPERATURE: 97.2 F | SYSTOLIC BLOOD PRESSURE: 128 MMHG | BODY MASS INDEX: 36.47 KG/M2 | WEIGHT: 193 LBS | DIASTOLIC BLOOD PRESSURE: 80 MMHG | OXYGEN SATURATION: 98 % | HEART RATE: 82 BPM

## 2021-09-07 DIAGNOSIS — F41.9 ANXIETY: Primary | ICD-10-CM

## 2021-09-07 PROCEDURE — 99213 OFFICE O/P EST LOW 20 MIN: CPT | Performed by: NURSE PRACTITIONER

## 2021-09-07 SDOH — ECONOMIC STABILITY: FOOD INSECURITY: WITHIN THE PAST 12 MONTHS, YOU WORRIED THAT YOUR FOOD WOULD RUN OUT BEFORE YOU GOT MONEY TO BUY MORE.: NEVER TRUE

## 2021-09-07 SDOH — ECONOMIC STABILITY: TRANSPORTATION INSECURITY
IN THE PAST 12 MONTHS, HAS THE LACK OF TRANSPORTATION KEPT YOU FROM MEDICAL APPOINTMENTS OR FROM GETTING MEDICATIONS?: NO

## 2021-09-07 SDOH — ECONOMIC STABILITY: TRANSPORTATION INSECURITY
IN THE PAST 12 MONTHS, HAS LACK OF TRANSPORTATION KEPT YOU FROM MEETINGS, WORK, OR FROM GETTING THINGS NEEDED FOR DAILY LIVING?: NO

## 2021-09-07 SDOH — ECONOMIC STABILITY: FOOD INSECURITY: WITHIN THE PAST 12 MONTHS, THE FOOD YOU BOUGHT JUST DIDN'T LAST AND YOU DIDN'T HAVE MONEY TO GET MORE.: NEVER TRUE

## 2021-09-07 ASSESSMENT — SOCIAL DETERMINANTS OF HEALTH (SDOH): HOW HARD IS IT FOR YOU TO PAY FOR THE VERY BASICS LIKE FOOD, HOUSING, MEDICAL CARE, AND HEATING?: NOT HARD AT ALL
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
## 2021-09-20 ENCOUNTER — OFFICE VISIT (OUTPATIENT)
Dept: FAMILY MEDICINE CLINIC | Age: 56
End: 2021-09-20
Payer: COMMERCIAL

## 2021-09-20 VITALS
WEIGHT: 194 LBS | BODY MASS INDEX: 36.66 KG/M2 | SYSTOLIC BLOOD PRESSURE: 120 MMHG | HEART RATE: 92 BPM | DIASTOLIC BLOOD PRESSURE: 80 MMHG | OXYGEN SATURATION: 98 % | TEMPERATURE: 97 F

## 2021-09-20 DIAGNOSIS — F41.9 ANXIETY: Primary | ICD-10-CM

## 2021-09-20 PROCEDURE — 99213 OFFICE O/P EST LOW 20 MIN: CPT | Performed by: NURSE PRACTITIONER

## 2021-09-20 NOTE — PROGRESS NOTES
[facility]  [id number] Kym Herrera. Julio Cesar Fail  [facility], [place] [address]  [phone]  [phone] 1814 Janis Herrera is a 61 y.o. female who is here with c/o of:    Chief Complaint: Anxiety (doing better)      Patient Accompanied by: n/a    HPI - 1814 Janis Herrera is here today with c/o:    Patient here for follow up anxiety. Reports her anxiety has improved with being off works for a few weeks. She says that she will be going back to work tomorrow with hopes of being transferred to new location soon. She says stress causes her weight gain. Doesn't feel she is eating more. Health Maintenance Due   Topic Date Due    Cervical cancer screen  2018    Flu vaccine (1) Never done        Patient Active Problem List:     Calcium kidney stone     Chest pain     Depression     JESSE on CPAP     Obesity (BMI 35.0-39.9 without comorbidity)     Past Medical History:   Diagnosis Date    Arthritis     Calcium kidney stone     history of.     Depression     Hematuria     Obesity (BMI 35.0-39.9 without comorbidity) 3/30/2016    Sleep apnea     + Cpap mask      Past Surgical History:   Procedure Laterality Date     SECTION      x3    COLONOSCOPY N/A 2021    COLONOSCOPY POLYPECTOMY COLD BIOPSY performed by Kai Freed MD at [address]      left foot has a pin    TONSILLECTOMY AND ADENOIDECTOMY       Family History   Problem Relation Age of Onset    Kidney Disease Mother     High Blood Pressure Mother     Other Father         dementia    High Blood Pressure Father     High Blood Pressure Sister     Other Sister         Sleep [place]    High Blood Pressure Brother     Other Brother         Sleep Apnea    High Blood Pressure Sister     Other Sister         Sleep Apnea     Social History     Tobacco Use    Smoking status: Never Smoker    Smokeless tobacco: Never Used   Substance Use Topics    Alcohol use: No     Alcohol/week: 0.0 standard drinks ALLERGIES:  No Known Allergies       Subjective   Review of Systems   · Constitutional:  Negative for activity change, appetite change,unexpected weight change, chills, fever, and fatigue. · HENT: Negative for ear pain, sore throat,  Rhinorrhea, sinus pain, sinus pressure, congestion. · Eyes:  Negative for pain and discharge. · Respiratory:  Negative for chest tightness, shortness of breath, wheezing, and cough. · Cardiovascular:  Negative for chest pain, palpitations and leg swelling. · Gastrointestinal: Negative for abdominal pain, blood in stool, constipation,diarrhea, nausea and vomiting. · Endocrine: Negative for cold intolerance, heat intolerance, polydipsia, polyphagia and polyuria. · Genitourinary: Negative for difficulty urinating, dysuria, flank pain, frequency, hematuria and urgency. · Musculoskeletal: Negative for arthralgias, back pain, joint swelling, myalgias, neck pain and neck stiffness. · Skin: Negative for rash and wound. · Allergic/Immunologic: Negative for environmental allergies and food allergies. · Neurological:  Negative for dizziness, light-headedness, numbness and headaches. · Hematological:  Negative for adenopathy. Does not bruise/bleed easily. · Psychiatric/Behavioral: Negative for self-injury, sleep disturbance and suicidal ideas. Objective   Physical Exam   PHYSICAL EXAM:   · Constitutional: Berry Shadow is oriented to person, place, and time. Vital signs are normal. Appears well-developed and well-nourished. · Eyes:PERRL, EOMI, Conjunctiva normal, No discharge. · Neck: Full passive range of motion. Non-tender on palpation. Neck supple. No thyromegaly present. Trachea normal.  · Cardiovascular: Normal rate, regular rhythm, S1, S2, no murmur, no gallop, no friction rub, intact distal pulses. · Pulmonary/Chest: Breath sounds are clear throughout, No respiratory distress, No wheezing, No chest tenderness.  Effort normal  · Skin: Skin is warm, dry and intact. No obvious lesions on exposed skin  · Psychiatric: Normal mood and affect. Speech is normal and behavior is normal.     Nursing note and vitals reviewed. Blood pressure 120/80, pulse 92, temperature 97 °F (36.1 °C), temperature source Temporal, weight 194 lb (88 kg), last menstrual period 01/08/2016, SpO2 98 %, not currently breastfeeding. Body mass index is 36.66 kg/m². Wt Readings from Last 3 Encounters:   09/20/21 194 lb (88 kg)   09/07/21 193 lb (87.5 kg)   06/16/21 184 lb (83.5 kg)     BP Readings from Last 3 Encounters:   09/20/21 120/80   09/07/21 128/80   06/16/21 124/78       Admission on 06/16/2021, Discharged on 06/16/2021   Component Date Value Ref Range Status    Surgical Pathology Report 06/16/2021    Final                    [id number]  [address]  [phone]  Fax: [phone]  SURGICAL PATHOLOGY REPORT    Patient Name: Chantelle Napoles  MR#: [id number]  Specimen #[id number]       Final Diagnosis  SIGMOID COLON, POLYPECTOMY:         [address] Mony Buchanan D.O.  **Electronically Signed Out**         [facility]/6/17/2021    Clinical Information  Screening; colonoscopy polypectomy cold biopsy; formalin time: 11:26    Source:  A: Sigmoid polyp    Gross Description  Received in formalin labeled \"sigmoid polyp\" is a single portion of  pink soft tissue measuring 0.3 cm in greatest dimension. The specimen  is entirely submitted in a single cassette. LJF/nikhils         Microscopic Description  Microscopic examination of two H&E slides confirms the diagnosis. No results found for this visit on 09/20/21. Completed Orders/Prescriptions   No orders of the defined types were placed in this encounter. AssessmentPlan/Medical Decision Making     1. Anxiety    - Improved       Return if symptoms worsen or fail to improve. 1.  [place] received counseling on the following healthy behaviors: n/a  2. Patient given educational materials - see patient instructions  3. Was a self-tracking handout given in paper form or via voxappt? No  If yes, see orders or list here. 4.  Discussed use, benefit, and side effects of prescribed medications. Barriers to medication compliance addressed. All patient questions answered. Pt voiced understanding. 5.  Reviewed prior labs, imaging, consultation, follow up, and health maintenance  6. Continue current medications, diet and exercise. 7. Discussed use, benefit, and side effects of prescribed medications. Barriers to medication compliance addressed. All her questions were answered. Pt voiced understanding. Matthew Lobo will continue current medications, diet and exercise. Patient given educational materials on anxiety/stress relief    Of the 20 minute duration appointment visit, Hoda Sullivan CNP spent at least 50% of the face-to-face time in counseling, explanation of diagnosis, planning of further management, and answering all questions.           Signed:  Hoda Sullivan CNP

## 2021-09-20 NOTE — PATIENT INSTRUCTIONS
Patient Education        Learning About Anxiety Disorders  What are anxiety disorders? Anxiety disorders are a type of medical problem. They cause severe anxiety. When you feel anxious, you feel that something bad is about to happen. This feeling interferes with your life. These disorders include:  · Generalized anxiety disorder. You feel worried and stressed about many everyday events and activities. This goes on for several months and disrupts your life on most days. · Panic disorder. You have repeated panic attacks. A panic attack is a sudden, intense fear or anxiety. It may make you feel short of breath. Your heart may pound. · Social anxiety disorder. You feel very anxious about what you will say or do in front of people. For example, you may be scared to talk or eat in public. This problem affects your daily life. · Phobias. You are very scared of a specific object, situation, or activity. For example, you may fear spiders, high places, or small spaces. What are the symptoms? Generalized anxiety disorder  Symptoms may include:  · Feeling worried and stressed about many things almost every day. · Feeling tired or irritable. You may have a hard time concentrating. · Having headaches or muscle aches. · Having a hard time getting to sleep or staying asleep. Panic disorder  You may have repeated panic attacks when there is no reason for feeling afraid. You may change your daily activities because you worry that you will have another attack. Symptoms may include:  · Intense fear, terror, or anxiety. · Trouble breathing or very fast breathing. · Chest pain or tightness. · A heartbeat that races or is not regular. Social anxiety disorder  Symptoms may include:  · Fear about a social situation, such as eating in front of others or speaking in public. You may worry a lot. Or you may be afraid that something bad will happen. · Anxiety that can cause you to blush, sweat, and feel shaky.   · A heartbeat that is faster than normal.  · A hard time focusing. Phobias  Symptoms may include:  · More fear than most people of being around an object, being in a situation, or doing an activity. You might also be stressed about the chance of being around the thing you fear. · Worry about losing control, panicking, fainting, or having physical symptoms like a faster heartbeat when you are around the situation or object. How are these disorders treated? Anxiety disorders can be treated with medicines or counseling. A combination of both may be used. Medicines may include:  · Antidepressants. These may help your symptoms by keeping chemicals in your brain in balance. · Benzodiazepines. These may give you short-term relief of your symptoms. Some people use cognitive-behavioral therapy. A therapist helps you learn to change stressful or bad thoughts into helpful thoughts. Lead a healthy lifestyle  A healthy lifestyle may help you feel better. · Get at least 30 minutes of exercise on most days of the week. Walking is a good choice. · Eat a healthy diet. Include fruits, vegetables, lean proteins, and whole grains in your diet each day. · Try to go to bed at the same time every night. Try for 8 hours of sleep a night. · Find ways to manage stress. Try relaxation exercises. · Avoid alcohol and illegal drugs. Follow-up care is a key part of your treatment and safety. Be sure to make and go to all appointments, and call your doctor if you are having problems. It's also a good idea to know your test results and keep a list of the medicines you take. Where can you learn more? Go to https://sumeet.Like.com. org and sign in to your Mobile Authentication account. Enter V389 in the [facility] box to learn more about \"Learning About Anxiety Disorders. \"     If you do not have an account, please click on the \"Sign Up Now\" link.   Current as of: September 23, 2020               Content Version: 12.9  © 6442-2814 Healthwise, Incorporated. Care instructions adapted under license by [facility] ([facility]). If you have questions about a medical condition or this instruction, always ask your healthcare professional. [address] any warranty or liability for your use of this information. Patient Education        Work-Life Balance: Care Instructions  Your Care Instructions    Do you ever feel like there is not enough time to do all of the things you have to do, and no time at all for the things you enjoy? If so, you are not alone. On average, people in the [place] have worked more and more hours each year since 1970. But in recent years, fewer people say they want to take on more at work, even if they would get promoted or get paid more money. More and more workers say they want time to spend with their families and to do things that are important to them. Do you ever feel:  · That you always have more and more work to do at your job? · That too many people depend on you every day? · That you never have enough time for your family or friends? · That you never have time for hobbies or things you enjoy? · That each second of your day is scheduled? If you answered \"yes\" to any of these questions, take steps at work and at home to get your life into balance. Follow-up care is a key part of your treatment and safety. Be sure to make and go to all appointments, and call your doctor if you are having problems. How can you care for yourself at home? Manage your time  · Focus on the important things. Taking on too much can wear you out. Look at how you spend your time, and redirect your focus. Learn to say \"no\" and let go of things that do not matter. · Set one small goal at a time. Use a day planner. Break large projects into smaller ones. · Ask for help. Let your children, your spouse, your coworkers, and other people in your life help you get things done.   · Leave your job at the office. If you give up free time to get more work done, you may pay for it with stress. If your job offers a flexible work schedule, use it to fit your own work style. For instance, come in earlier to have a longer lunch break, or make time for a yoga class or workout during your workday. · Unplug. Do not let technology (such as your cell phone or the Internet) erase the line between your time and your employer's time. Lower job stress  Job stress causes trouble at work and at home. At work, you may worry about things you have not had time to do at home. At home, you may worry about your job. This cycle upsets your work-life balance. Lowering your job stress can get your life back in balance. Job stress can be caused by:  · Pressure and deadlines. · Heavy workloads or long hours. · Not being allowed to make decisions. · Health and safety hazards. · Feeling you may lose your job. · Unclear or changing job duties. · Too much responsibility. · Work that is very tiring or boring. Do any of these things bother you? Consider talking with your boss to change things. There are some things that you may not be able to control. But even a few small changes might help lower your stress. Take advantage of programs at work  Businesses make money and are better off in other ways if their employees are healthy and happy. For this reason, many companies have programs to help balance work life and home life. These programs may include:  · Flexible schedules and hours. · Time off for family reasons, education, or community service. · Being able to work from home. · Employee assistance programs to provide counseling. · Child-care programs. Check to see if your company has any of these or other programs that could help you. If not, consider talking to your boss about why work-life balance programs make good business sense.  Even if your company does not start an official program, you may be able to get flexible hours, time off, or the ability to do some work from home. Know when to quit  If you are truly unhappy because of a stressful job, and if the suggestions here have not worked, it may be time to think about changing jobs or changing careers. But before you quit, take time to research your options. Where can you learn more? Go to https://chpepiceweb.Ivantis. org and sign in to your [employer] account. Enter N409 in the [employer] box to learn more about \"Work-Life Balance: Care Instructions. \"     If you do not have an account, please click on the \"Sign Up Now\" link. Current as of: August 31, 2020               Content Version: 12.9  © 2006-2021 Healthwise, Incorporated. Care instructions adapted under license by [facility] ([facility]). If you have questions about a medical condition or this instruction, always ask your healthcare professional. [address] any warranty or liability for your use of this information.

## 2022-02-07 ENCOUNTER — HOSPITAL ENCOUNTER (OUTPATIENT)
Dept: GENERAL RADIOLOGY | Age: 57
Discharge: HOME | End: 2022-02-09
Payer: COMMERCIAL

## 2022-02-07 ENCOUNTER — HOSPITAL ENCOUNTER (OUTPATIENT)
Age: 57
Discharge: HOME | End: 2022-02-09
Payer: COMMERCIAL

## 2022-02-07 ENCOUNTER — OFFICE VISIT (OUTPATIENT)
Dept: FAMILY MEDICINE CLINIC | Age: 57
End: 2022-02-07
Payer: COMMERCIAL

## 2022-02-07 VITALS
HEART RATE: 64 BPM | BODY MASS INDEX: 37.45 KG/M2 | SYSTOLIC BLOOD PRESSURE: 124 MMHG | TEMPERATURE: 97.1 F | WEIGHT: 198.2 LBS | DIASTOLIC BLOOD PRESSURE: 80 MMHG | OXYGEN SATURATION: 98 %

## 2022-02-07 DIAGNOSIS — M54.2 NECK PAIN: ICD-10-CM
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
## 2022-02-07 DIAGNOSIS — M25.512 ACUTE PAIN OF LEFT SHOULDER: ICD-10-CM

## 2022-02-07 DIAGNOSIS — S09.90XA INJURY OF HEAD, INITIAL ENCOUNTER: ICD-10-CM

## 2022-02-07 DIAGNOSIS — M25.562 ACUTE PAIN OF LEFT KNEE: ICD-10-CM

## 2022-02-07 PROCEDURE — 73562 X-RAY EXAM OF KNEE 3: CPT

## 2022-02-07 PROCEDURE — 96372 THER/PROPH/DIAG INJ SC/IM: CPT | Performed by: NURSE PRACTITIONER

## 2022-02-07 PROCEDURE — 72050 X-RAY EXAM NECK SPINE 4/5VWS: CPT

## 2022-02-07 PROCEDURE — 72072 X-RAY EXAM THORAC SPINE 3VWS: CPT

## 2022-02-07 PROCEDURE — 73030 X-RAY EXAM OF SHOULDER: CPT

## 2022-02-07 PROCEDURE — 99214 OFFICE O/P EST MOD 30 MIN: CPT | Performed by: NURSE PRACTITIONER

## 2022-02-07 RX ORDER — KETOROLAC TROMETHAMINE 30 MG/ML
60 INJECTION, SOLUTION INTRAMUSCULAR; INTRAVENOUS ONCE
Status: COMPLETED | OUTPATIENT
Start: 2022-02-07 | End: 2022-02-07

## 2022-02-07 RX ADMIN — KETOROLAC TROMETHAMINE 60 MG: 30 INJECTION, SOLUTION INTRAMUSCULAR; INTRAVENOUS at 13:51

## 2022-02-07 ASSESSMENT — PATIENT HEALTH QUESTIONNAIRE - PHQ9
SUM OF ALL RESPONSES TO PHQ QUESTIONS 1-9: 0
5. POOR APPETITE OR OVEREATING: 0
2. FEELING DOWN, DEPRESSED OR HOPELESS: 0
2. FEELING DOWN, DEPRESSED OR HOPELESS: 0
1. LITTLE INTEREST OR PLEASURE IN DOING THINGS: 0
6. FEELING BAD ABOUT YOURSELF - OR THAT YOU ARE A FAILURE OR HAVE LET YOURSELF OR YOUR FAMILY DOWN: 0
SUM OF ALL RESPONSES TO PHQ QUESTIONS 1-9: 0
8. MOVING OR SPEAKING SO SLOWLY THAT OTHER PEOPLE COULD HAVE NOTICED. OR THE OPPOSITE, BEING SO FIGETY OR RESTLESS THAT YOU HAVE BEEN MOVING AROUND A LOT MORE THAN USUAL: 0
SUM OF ALL RESPONSES TO PHQ9 QUESTIONS 1 & 2: 0
SUM OF ALL RESPONSES TO PHQ QUESTIONS 1-9: 0
4. FEELING TIRED OR HAVING LITTLE ENERGY: 0
SUM OF ALL RESPONSES TO PHQ QUESTIONS 1-9: 0
3. TROUBLE FALLING OR STAYING ASLEEP: 0
7. TROUBLE CONCENTRATING ON THINGS, SUCH AS READING THE NEWSPAPER OR WATCHING TELEVISION: 0
SUM OF ALL RESPONSES TO PHQ QUESTIONS 1-9: 0
SUM OF ALL RESPONSES TO PHQ QUESTIONS 1-9: 0
9. THOUGHTS THAT YOU WOULD BE BETTER OFF DEAD, OR OF HURTING YOURSELF: 0
1. LITTLE INTEREST OR PLEASURE IN DOING THINGS: 0
10. IF YOU CHECKED OFF ANY PROBLEMS, HOW DIFFICULT HAVE THESE PROBLEMS MADE IT FOR YOU TO DO YOUR WORK, TAKE CARE OF THINGS AT HOME, OR GET ALONG WITH OTHER PEOPLE: 0
SUM OF ALL RESPONSES TO PHQ9 QUESTIONS 1 & 2: 0
SUM OF ALL RESPONSES TO PHQ QUESTIONS 1-9: 0
SUM OF ALL RESPONSES TO PHQ QUESTIONS 1-9: 0

## 2022-02-07 ASSESSMENT — ENCOUNTER SYMPTOMS
COLOR CHANGE: 0
ALLERGIC/IMMUNOLOGIC NEGATIVE: 1
CONSTIPATION: 0
SHORTNESS OF BREATH: 0
RESPIRATORY NEGATIVE: 1
COUGH: 0
EYES NEGATIVE: 1
BACK PAIN: 0
CHEST TIGHTNESS: 0
NAUSEA: 1
VOMITING: 0
DIARRHEA: 0
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
## 2022-09-30 ENCOUNTER — TELEPHONE (OUTPATIENT)
Dept: FAMILY MEDICINE CLINIC | Age: 57
End: 2022-09-30

## 2022-09-30 NOTE — TELEPHONE ENCOUNTER
Does she need to go back to [facility] and get a provider there to fill out paperwork since they diagnosed her with the Tricia?

## 2022-09-30 NOTE — TELEPHONE ENCOUNTER
----- Message from Spencer Fish sent at 9/30/2022  1:46 PM EDT -----  Subject: Message to Provider    QUESTIONS  Information for Provider? Patient tested positive with COVID on the 9/28   at [facility] and she has forms for her job due to being off. They want the provider to fill out forms to release to go back to work and   get paid. Patient will go back to work on [address] - 10/4. She is wondering   how to get the forms to you due to having COVID. She is wondering can she   email? Please call patient. TY  ---------------------------------------------------------------------------  --------------  Crystal GUNDERSON  [phone]; OK to leave message on voicemail  ---------------------------------------------------------------------------  --------------  SCRIPT ANSWERS  Relationship to Patient?  Self

## 2022-10-03 NOTE — TELEPHONE ENCOUNTER
Patient dropped off paperwork here at the office, [facility] would not fill out the paperwork after all this time. The paperwork is the leave of absence work so she can get paid from missing work. *This morning 10/3 took at Home Test and still was Positive. *    Symptoms are fever, cough, headache and fatigue on 10/3. First tested at 9/28 at [facility]. Supposed to go to work tomorrow 10/4 at 10:00AM. Should she still be off work? HR says if she still has a fever then she cannot go back to work. So if her date of returning to works needs to be adjusted please note on paperwork. Please call and advise patient with what to do for getting the paperwork filled out. Thank you.

## 2022-11-07 ENCOUNTER — TELEPHONE (OUTPATIENT)
Dept: FAMILY MEDICINE CLINIC | Age: 57
End: 2022-11-07

## 2022-11-07 ENCOUNTER — OFFICE VISIT (OUTPATIENT)
Dept: FAMILY MEDICINE CLINIC | Age: 57
End: 2022-11-07
Payer: COMMERCIAL

## 2022-11-07 VITALS
HEART RATE: 68 BPM | HEIGHT: 61 IN | OXYGEN SATURATION: 98 % | BODY MASS INDEX: 37.46 KG/M2 | WEIGHT: 198.4 LBS | DIASTOLIC BLOOD PRESSURE: 80 MMHG | SYSTOLIC BLOOD PRESSURE: 130 MMHG

## 2022-11-07 DIAGNOSIS — G47.33 OSA ON CPAP: ICD-10-CM

## 2022-11-07 DIAGNOSIS — Z00.00 ENCOUNTER FOR WELL ADULT EXAM WITHOUT ABNORMAL FINDINGS: Primary | ICD-10-CM

## 2022-11-07 DIAGNOSIS — Z99.89 OSA ON CPAP: ICD-10-CM

## 2022-11-07 PROCEDURE — 99396 PREV VISIT EST AGE 40-64: CPT | Performed by: NURSE PRACTITIONER

## 2022-11-07 SDOH — ECONOMIC STABILITY: FOOD INSECURITY: WITHIN THE PAST 12 MONTHS, THE FOOD YOU BOUGHT JUST DIDN'T LAST AND YOU DIDN'T HAVE MONEY TO GET MORE.: NEVER TRUE

## 2022-11-07 SDOH — ECONOMIC STABILITY: FOOD INSECURITY: WITHIN THE PAST 12 MONTHS, YOU WORRIED THAT YOUR FOOD WOULD RUN OUT BEFORE YOU GOT MONEY TO BUY MORE.: NEVER TRUE

## 2022-11-07 ASSESSMENT — SOCIAL DETERMINANTS OF HEALTH (SDOH): HOW HARD IS IT FOR YOU TO PAY FOR THE VERY BASICS LIKE FOOD, HOUSING, MEDICAL CARE, AND HEATING?: NOT HARD AT ALL

## 2022-11-07 NOTE — PROGRESS NOTES
Well Adult Note  Name: Michael Espitia Date: 2022   MRN: [id number] Sex: Female   Age: 61 y.o. Ethnicity: Non- / Non    : 1965 Race: Black / [place] is here for well adult exam.  History:    Has been doing well. Recently had COVID. Exercise- stays active  Diet- eats a balanced diet  Smoker- no  Alcohol- no  Sleep- Averages 7 hours CPAP      Review of Systems   Constitutional:  Positive for fatigue. All other systems reviewed and are negative. No Known Allergies      Prior to Visit Medications    Medication Sig Taking? Authorizing Provider   CPAP Machine MISC by Does not apply route CPAP supplies Yes Brandon Sharpe APRN - CNP   ELDERBERRY PO Take 1 each by mouth Yes Historical Provider, MD   magnesium 30 MG tablet Take 30 mg by mouth nightly as needed Yes Historical Provider, MD   turmeric 500 MG CAPS Take by mouth Yes Historical Provider, MD   Ascorbic Acid (TREVOR-C PO) Take by mouth Yes Historical Provider, MD   ibuprofen (ADVIL;MOTRIN) 800 MG tablet Take 1 tablet by mouth every 8 hours as needed for Pain Yes Connie Sesay MD   Multiple Vitamins-Minerals (THERAPEUTIC MULTIVITAMIN-MINERALS) tablet Take 1 tablet by mouth daily. Yes Historical Provider, MD         Past Medical History:   Diagnosis Date    Arthritis     Calcium kidney stone     history of.     Depression     Hematuria     Obesity (BMI 35.0-39.9 without comorbidity) 3/30/2016    Sleep apnea     + Cpap mask       Past Surgical History:   Procedure Laterality Date     SECTION      x3    COLONOSCOPY N/A 2021    COLONOSCOPY POLYPECTOMY COLD BIOPSY performed by Eugene Devi MD at [address] [place] [address]      left foot has a pin    TONSILLECTOMY AND ADENOIDECTOMY           Family History   Problem Relation Age of Onset    Kidney Disease Mother     High Blood Pressure Mother     Other Father         dementia    High Blood Pressure Father     High Blood Pressure Sister Other Sister         Sleep Apena    High Blood Pressure Brother     Other Brother         Sleep Apnea    High Blood Pressure Sister     Other Sister         Sleep Apnea       Social History     Tobacco Use    Smoking status: Never    Smokeless tobacco: Never   Vaping Use    Vaping Use: Never used   Substance Use Topics    Alcohol use: No     Alcohol/week: 0.0 standard drinks    Drug use: No       Objective   /80   Pulse 68   Ht 5' 1\" (1.549 m)   Wt 198 lb 6.4 oz (90 kg)   LMP 01/08/2016   SpO2 98%   BMI 37.49 kg/m²   Wt Readings from Last 3 Encounters:   11/07/22 198 lb 6.4 oz (90 kg)   02/07/22 198 lb 3.2 oz (89.9 kg)   09/20/21 194 lb (88 kg)     There were no vitals filed for this visit. Physical Exam  Vitals reviewed. Constitutional:       Appearance: Normal appearance. She is obese. HENT:      Head: Normocephalic and atraumatic. Eyes:      Pupils: Pupils are equal, round, and reactive to light. Cardiovascular:      Rate and Rhythm: Normal rate and regular rhythm. Pulses: Normal pulses. Heart sounds: Normal heart sounds. Pulmonary:      Effort: Pulmonary effort is normal.      Breath sounds: Normal breath sounds. Abdominal:      General: Bowel sounds are normal.   Musculoskeletal:         General: Normal range of motion. Cervical back: Normal range of motion and neck supple. Skin:     General: Skin is warm and dry. Neurological:      Mental Status: She is alert and oriented to person, place, and time. Psychiatric:         Mood and Affect: Mood normal.         Behavior: Behavior normal.         Thought Content: Thought content normal.         Judgment: Judgment normal.         Assessment   Plan   1. Encounter for well adult exam without abnormal findings  -     CBC with Auto Differential; Future  -     Comprehensive Metabolic Panel; Future  -     TSH with Reflex; Future  -     Lipid, Fasting; Future  2.  JESSE on CPAP  -     CPAP Machine MISC; Starting Mon 11/7/2022, Disp-1 each, R-0, PrintCPAP supplies  -     Sleep Study with PAP Titration;  Future       Personalized Preventive Plan   Current Health Maintenance Status  Immunization History   Administered Date(s) Administered    Tdap (Boostrix, Adacel) 06/30/2017        Health Maintenance   Topic Date Due    COVID-19 Vaccine (1) Never done    HIV screen  Never done    Hepatitis C screen  Never done    Shingles vaccine (1 of 2) Never done    Cervical cancer screen  08/27/2021    Flu vaccine (1) Never done    Depression Monitoring  02/07/2023    Breast cancer screen  02/20/2023    Lipids  08/30/2023    DTaP/Tdap/Td vaccine (2 - Td or Tdap) 06/30/2027    Colorectal Cancer Screen  06/16/2031    Hepatitis A vaccine  Aged Out    Hib vaccine  Aged Out    Meningococcal (ACWY) vaccine  Aged Out    Pneumococcal 0-64 years Vaccine  Aged Out     Recommendations for [employer] Due: see orders and patient instructions/AVS.    Return in about 1 year (around 11/7/2023) for annual physical.

## 2022-11-07 NOTE — PATIENT INSTRUCTIONS
Well Visit, Women 48 to 72: Care Instructions  Overview     Well visits can help you stay healthy. Your doctor has checked your overall health and may have suggested ways to take good care of yourself. Your doctor also may have recommended tests. At home, you can help prevent illness with healthy eating, regular exercise, and other steps. Follow-up care is a key part of your treatment and safety. Be sure to make and go to all appointments, and call your doctor if you are having problems. It's also a good idea to know your test results and keep a list of the medicines you take. How can you care for yourself at home? Get screening tests that you and your doctor decide on. Screening helps find diseases before any symptoms appear. Eat healthy foods. Choose fruits, vegetables, whole grains, protein, and low-fat dairy foods. Limit fat, especially saturated fat. Reduce salt in your diet. Limit alcohol. Have no more than 1 drink a day or 7 drinks a week. Get at least 30 minutes of exercise on most days of the week. Walking is a good choice. You also may want to do other activities, such as running, swimming, cycling, or playing tennis or team sports. Reach and stay at a healthy weight. This will lower your risk for many problems, such as obesity, diabetes, heart disease, and high blood pressure. Do not smoke. Smoking can make health problems worse. If you need help quitting, talk to your doctor about stop-smoking programs and medicines. These can increase your chances of quitting for good. Care for your mental health. It is easy to get weighed down by worry and stress. Learn strategies to manage stress, like deep breathing and mindfulness, and stay connected with your family and community. If you find you often feel sad or hopeless, talk with your doctor. Treatment can help. Talk to your doctor about whether you have any risk factors for sexually transmitted infections (STIs).  You can help prevent STIs if you

## 2022-11-07 NOTE — TELEPHONE ENCOUNTER
Alli Cárdenas from [address] [place] [address] called stating patient is in need of an RX for her Pap machine supplies. Mask, tubing filters, ect. Alli Cárdenas would like it to be faxed to [phone].

## 2022-11-14 ENCOUNTER — HOSPITAL ENCOUNTER (OUTPATIENT)
Age: 57
Setting detail: SPECIMEN
Discharge: HOME | End: 2022-11-14

## 2022-11-14 DIAGNOSIS — Z00.00 ENCOUNTER FOR WELL ADULT EXAM WITHOUT ABNORMAL FINDINGS: ICD-10-CM

## 2022-11-14 LAB
ABSOLUTE EOS #: 0.21 K/UL (ref 0–0.44)
ABSOLUTE IMMATURE GRANULOCYTE: <0.03 K/UL (ref 0–0.3)
ABSOLUTE LYMPH #: 2.31 K/UL (ref 1.1–3.7)
ABSOLUTE MONO #: 0.42 K/UL (ref 0.1–1.2)
ALBUMIN SERPL-MCNC: 4.5 G/DL (ref 3.5–5.2)
ALBUMIN/GLOBULIN RATIO: 1.7 (ref 1–2.5)
ALP BLD-CCNC: 73 U/L (ref 35–104)
ALT SERPL-CCNC: 13 U/L (ref 5–33)
ANION GAP SERPL CALCULATED.3IONS-SCNC: 16 MMOL/L (ref 9–17)
AST SERPL-CCNC: 24 U/L
BASOPHILS # BLD: 1 % (ref 0–2)
BASOPHILS ABSOLUTE: <0.03 K/UL (ref 0–0.2)
BILIRUB SERPL-MCNC: 0.4 MG/DL (ref 0.3–1.2)
BUN BLDV-MCNC: 8 MG/DL (ref 6–20)
CALCIUM SERPL-MCNC: 9.6 MG/DL (ref 8.6–10.4)
CHLORIDE BLD-SCNC: 106 MMOL/L (ref 98–107)
CHOLESTEROL, FASTING: 228 MG/DL
CHOLESTEROL/HDL RATIO: 2.3
CO2: 22 MMOL/L (ref 20–31)
CREAT SERPL-MCNC: 0.74 MG/DL (ref 0.5–0.9)
EOSINOPHILS RELATIVE PERCENT: 5 % (ref 1–4)
GFR SERPL CREATININE-BSD FRML MDRD: >60 ML/MIN/1.73M2
GLUCOSE BLD-MCNC: 83 MG/DL (ref 70–99)
HCT VFR BLD CALC: 42.9 % (ref 36.3–47.1)
HDLC SERPL-MCNC: 101 MG/DL
HEMOGLOBIN: 12.9 G/DL (ref 11.9–15.1)
IMMATURE GRANULOCYTES: 0 %
LDL CHOLESTEROL: 104 MG/DL (ref 0–130)
LYMPHOCYTES # BLD: 54 % (ref 24–43)
MCH RBC QN AUTO: 26.9 PG (ref 25.2–33.5)
MCHC RBC AUTO-ENTMCNC: 30.1 G/DL (ref 28.4–34.8)
MCV RBC AUTO: 89.6 FL (ref 82.6–102.9)
MONOCYTES # BLD: 10 % (ref 3–12)
NRBC AUTOMATED: 0 PER 100 WBC
PDW BLD-RTO: 13.6 % (ref 11.8–14.4)
PLATELET # BLD: 279 K/UL (ref 138–453)
PMV BLD AUTO: 11.8 FL (ref 8.1–13.5)
POTASSIUM SERPL-SCNC: 4.5 MMOL/L (ref 3.7–5.3)
RBC # BLD: 4.79 M/UL (ref 3.95–5.11)
SEG NEUTROPHILS: 30 % (ref 36–65)
SEGMENTED NEUTROPHILS ABSOLUTE COUNT: 1.29 K/UL (ref 1.5–8.1)
SODIUM BLD-SCNC: 144 MMOL/L (ref 135–144)
TOTAL PROTEIN: 7.2 G/DL (ref 6.4–8.3)
TRIGLYCERIDE, FASTING: 115 MG/DL
TSH SERPL DL<=0.05 MIU/L-ACNC: 0.5 UIU/ML (ref 0.3–5)
WBC # BLD: 4.3 K/UL (ref 3.5–11.3)

## 2022-11-30 ENCOUNTER — TELEPHONE (OUTPATIENT)
Dept: FAMILY MEDICINE CLINIC | Age: 57
End: 2022-11-30

## 2022-11-30 DIAGNOSIS — Z99.89 OSA ON CPAP: Primary | ICD-10-CM

## 2022-11-30 DIAGNOSIS — G47.33 OSA ON CPAP: Primary | ICD-10-CM

## 2022-11-30 NOTE — TELEPHONE ENCOUNTER
Please let patient know that sleep study was denied and she has placed a referral to pulmonary.      [facility] - Nory Colon MD   [facility], [address] [place] [address]   [phone]

## 2023-02-21 ENCOUNTER — TELEPHONE (OUTPATIENT)
Dept: FAMILY MEDICINE CLINIC | Age: 58
End: 2023-02-21

## 2023-02-21 ENCOUNTER — HOSPITAL ENCOUNTER (OUTPATIENT)
Dept: MAMMOGRAPHY | Age: 58
Discharge: HOME | End: 2023-02-23
Payer: COMMERCIAL

## 2023-02-21 DIAGNOSIS — Z12.31 VISIT FOR SCREENING MAMMOGRAM: ICD-10-CM

## 2023-02-21 PROCEDURE — 77067 SCR MAMMO BI INCL CAD: CPT

## 2023-02-21 NOTE — TELEPHONE ENCOUNTER
Patient is asking if you need to see her prior to sending in a request for new supplies for her sleep machine for insurance purposes.     Please advise

## 2023-02-27 ENCOUNTER — OFFICE VISIT (OUTPATIENT)
Dept: FAMILY MEDICINE CLINIC | Age: 58
End: 2023-02-27
Payer: COMMERCIAL

## 2023-02-27 VITALS
HEART RATE: 93 BPM | DIASTOLIC BLOOD PRESSURE: 84 MMHG | SYSTOLIC BLOOD PRESSURE: 120 MMHG | TEMPERATURE: 97.7 F | OXYGEN SATURATION: 96 % | WEIGHT: 196 LBS | BODY MASS INDEX: 37.03 KG/M2

## 2023-02-27 DIAGNOSIS — G47.33 OSA ON CPAP: Primary | ICD-10-CM

## 2023-02-27 DIAGNOSIS — Z99.89 OSA ON CPAP: Primary | ICD-10-CM

## 2023-02-27 PROCEDURE — 99213 OFFICE O/P EST LOW 20 MIN: CPT | Performed by: NURSE PRACTITIONER

## 2023-02-27 SDOH — ECONOMIC STABILITY: FOOD INSECURITY: WITHIN THE PAST 12 MONTHS, THE FOOD YOU BOUGHT JUST DIDN'T LAST AND YOU DIDN'T HAVE MONEY TO GET MORE.: NEVER TRUE

## 2023-02-27 SDOH — ECONOMIC STABILITY: FOOD INSECURITY: WITHIN THE PAST 12 MONTHS, YOU WORRIED THAT YOUR FOOD WOULD RUN OUT BEFORE YOU GOT MONEY TO BUY MORE.: NEVER TRUE

## 2023-02-27 SDOH — ECONOMIC STABILITY: HOUSING INSECURITY
IN THE LAST 12 MONTHS, WAS THERE A TIME WHEN YOU DID NOT HAVE A STEADY PLACE TO SLEEP OR SLEPT IN A SHELTER (INCLUDING NOW)?: NO

## 2023-02-27 SDOH — ECONOMIC STABILITY: INCOME INSECURITY: HOW HARD IS IT FOR YOU TO PAY FOR THE VERY BASICS LIKE FOOD, HOUSING, MEDICAL CARE, AND HEATING?: NOT HARD AT ALL

## 2023-02-27 ASSESSMENT — PATIENT HEALTH QUESTIONNAIRE - PHQ9
SUM OF ALL RESPONSES TO PHQ QUESTIONS 1-9: 0
SUM OF ALL RESPONSES TO PHQ QUESTIONS 1-9: 0
4. FEELING TIRED OR HAVING LITTLE ENERGY: 0
2. FEELING DOWN, DEPRESSED OR HOPELESS: 0
3. TROUBLE FALLING OR STAYING ASLEEP: 0
10. IF YOU CHECKED OFF ANY PROBLEMS, HOW DIFFICULT HAVE THESE PROBLEMS MADE IT FOR YOU TO DO YOUR WORK, TAKE CARE OF THINGS AT HOME, OR GET ALONG WITH OTHER PEOPLE: 0
1. LITTLE INTEREST OR PLEASURE IN DOING THINGS: 0
5. POOR APPETITE OR OVEREATING: 0
7. TROUBLE CONCENTRATING ON THINGS, SUCH AS READING THE NEWSPAPER OR WATCHING TELEVISION: 0
6. FEELING BAD ABOUT YOURSELF - OR THAT YOU ARE A FAILURE OR HAVE LET YOURSELF OR YOUR FAMILY DOWN: 0
SUM OF ALL RESPONSES TO PHQ9 QUESTIONS 1 & 2: 0
9. THOUGHTS THAT YOU WOULD BE BETTER OFF DEAD, OR OF HURTING YOURSELF: 0
SUM OF ALL RESPONSES TO PHQ QUESTIONS 1-9: 0
SUM OF ALL RESPONSES TO PHQ QUESTIONS 1-9: 0
8. MOVING OR SPEAKING SO SLOWLY THAT OTHER PEOPLE COULD HAVE NOTICED. OR THE OPPOSITE, BEING SO FIGETY OR RESTLESS THAT YOU HAVE BEEN MOVING AROUND A LOT MORE THAN USUAL: 0

## 2023-02-27 NOTE — PROGRESS NOTES
[place] [id number]  [id number]  [facility] [address]  [phone]  [phone] [address] Her[address] is a 61 y.o. female who is here with c/o of:    Chief Complaint: Sleep Apnea (supplies)      Patient Accompanied by: n/a    HPI - [address] Jan[address] Herrera is here today with c/o:    Patient here needing new CPAP supplies. She was dx in  via sleep study. She averages 6-8 hours of sleep using machine. Compliance: compliant all of the time. Residual symptoms include: none. Health Maintenance Due   Topic Date Due    COVID-19 Vaccine (1) Never done    HIV screen  Never done    Hepatitis C screen  Never done    Cervical cancer screen  2021    Flu vaccine (1) Never done    Depression Monitoring  2023        Patient Active Problem List:     Depression     JESSE on CPAP     Obesity (BMI 35.0-39.9 without comorbidity)     Past Medical History:   Diagnosis Date    Arthritis     Calcium kidney stone     history of.     Depression     Hematuria     Obesity (BMI 35.0-39.9 without comorbidity) 3/30/2016    Sleep apnea     + Cpap mask      Past Surgical History:   Procedure Laterality Date     SECTION      x3    COLONOSCOPY N/A 2021    COLONOSCOPY POLYPECTOMY COLD BIOPSY performed by Ilya Flores MD at [address] [place] [address]      left foot has a pin    TONSILLECTOMY AND ADENOIDECTOMY       Family History   Problem Relation Age of Onset    Kidney Disease Mother     High Blood Pressure Mother     Other Father         dementia    High Blood Pressure Father     High Blood Pressure Sister     Other Sister         Sleep Apena    High Blood Pressure Brother     Other Brother         Sleep Apnea    High Blood Pressure Sister     Other Sister         Sleep Apnea     Social History     Tobacco Use    Smoking status: Never    Smokeless tobacco: Never   Substance Use Topics    Alcohol use: No     Alcohol/week: 0.0 standard drinks     ALLERGIES:  No Known Allergies       Subjective Review of Systems   Constitutional:  Negative for activity change, appetite change,unexpected weight change, chills, fever, and fatigue. HENT: Negative for ear pain, sore throat,  Rhinorrhea, sinus pain, sinus pressure, congestion. Eyes:  Negative for pain and discharge. Respiratory:  Negative for chest tightness, shortness of breath, wheezing, and cough. Cardiovascular:  Negative for chest pain, palpitations and leg swelling. Gastrointestinal: Negative for abdominal pain, blood in stool, constipation,diarrhea, nausea and vomiting. Endocrine: Negative for cold intolerance, heat intolerance, polydipsia, polyphagia and polyuria. Genitourinary: Negative for difficulty urinating, dysuria, flank pain, frequency, hematuria and urgency. Musculoskeletal: Negative for arthralgias, back pain, joint swelling, myalgias, neck pain and neck stiffness. Skin: Negative for rash and wound. Allergic/Immunologic: Negative for environmental allergies and food allergies. Neurological:  Negative for dizziness, light-headedness, numbness and headaches. Hematological:  Negative for adenopathy. Does not bruise/bleed easily. Psychiatric/Behavioral: Negative for self-injury, sleep disturbance and suicidal ideas. Objective   Physical Exam   PHYSICAL EXAM:   Constitutional: Natasha Hall is oriented to person, place, and time. Vital signs are normal. Appears well-developed and well-nourished. Head: Normocephalic and atraumatic. Eyes:PERRL, EOMI, Conjunctiva normal, No discharge. Neck: Full passive range of motion. Non-tender on palpation. Neck supple. No thyromegaly present. Trachea normal.  Cardiovascular: Normal rate, regular rhythm, S1, S2, no murmur, no gallop, no friction rub, intact distal pulses. Pulmonary/Chest: Breath sounds are clear throughout, No respiratory distress, No wheezing, No chest tenderness. Effort normal  Musculoskeletal: Extremities appear regular and symmetric.  No evident masses, lesions, foreign bodies, or other abnormalities. No edema. No tenderness on palpation. Joints are stable. Full ROM, strength and tone are within normal limits. Neurological: Alert and oriented to person, place, and time. Normal motor function, Normal sensory function, No focal deficits noted. He has normal strength. Skin: Skin is warm, dry and intact. No obvious lesions on exposed skin  Psychiatric: Normal mood and affect. Speech is normal and behavior is normal.     Nursing note and vitals reviewed. Blood pressure 120/84, pulse 93, temperature 97.7 °F (36.5 °C), temperature source Temporal, weight 196 lb (88.9 kg), last menstrual period 01/08/2016, SpO2 96 %, not currently breastfeeding. Body mass index is 37.03 kg/m².     Wt Readings from Last 3 Encounters:   02/27/23 196 lb (88.9 kg)   11/07/22 198 lb 6.4 oz (90 kg)   02/07/22 198 lb 3.2 oz (89.9 kg)     BP Readings from Last 3 Encounters:   02/27/23 120/84   11/07/22 130/80   02/07/22 124/80       Hospital Outpatient Visit on 11/14/2022   Component Date Value Ref Range Status    WBC 11/14/2022 4.3  3.5 - 11.3 k/uL Final    RBC 11/14/2022 4.79  3.95 - 5.11 m/uL Final    Hemoglobin 11/14/2022 12.9  11.9 - 15.1 g/dL Final    Hematocrit 11/14/2022 42.9  36.3 - 47.1 % Final    MCV 11/14/2022 89.6  82.6 - 102.9 fL Final    MCH 11/14/2022 26.9  25.2 - 33.5 pg Final    MCHC 11/14/2022 30.1  28.4 - 34.8 g/dL Final    RDW 11/14/2022 13.6  11.8 - 14.4 % Final    Platelets 67/62/8262 279  138 - 453 k/uL Final    MPV 11/14/2022 11.8  8.1 - 13.5 fL Final    NRBC Automated 11/14/2022 0.0  0.0 per 100 WBC Final    Seg Neutrophils 11/14/2022 30 (A)  36 - 65 % Final    Lymphocytes 11/14/2022 54 (A)  24 - 43 % Final    Monocytes 11/14/2022 10  3 - 12 % Final    Eosinophils % 11/14/2022 5 (A)  1 - 4 % Final    Basophils 11/14/2022 1  0 - 2 % Final    Immature Granulocytes 11/14/2022 0  0 % Final    Segs Absolute 11/14/2022 1.29 (A)  1.50 - 8.10 k/uL Final    Absolute Lymph # 11/14/2022 2.31  1.10 - 3.70 k/uL Final    Absolute Mono # 11/14/2022 0.42  0.10 - 1.20 k/uL Final    Absolute Eos # 11/14/2022 0.21  0.00 - 0.44 k/uL Final    Basophils Absolute 11/14/2022 <0.03  0.00 - 0.20 k/uL Final    Absolute Immature Granulocyte 11/14/2022 <0.03  0.00 - 0.30 k/uL Final    Glucose 11/14/2022 83  70 - 99 mg/dL Final    BUN 11/14/2022 8  6 - 20 mg/dL Final    Creatinine 11/14/2022 0.74  0.50 - 0.90 mg/dL Final    Est, Glom Filt Rate 11/14/2022 >60  >60 mL/min/1.73m2 Final    Comment:       Effective Oct 3, 2022        These results are not intended for use in patients <25years of age. eGFR results are calculated without a race factor using the 2021 CKD-EPI equation. Careful clinical correlation is recommended, particularly when comparing to results   calculated using previous equations. The CKD-EPI equation is less accurate in patients with extremes of muscle mass, extra-renal   metabolism of creatine, excessive creatine ingestion, or following therapy that affects   renal tubular secretion.       Calcium 11/14/2022 9.6  8.6 - 10.4 mg/dL Final    Sodium 11/14/2022 144  135 - 144 mmol/L Final    Potassium 11/14/2022 4.5  3.7 - 5.3 mmol/L Final    Chloride 11/14/2022 106  98 - 107 mmol/L Final    CO2 11/14/2022 22  20 - 31 mmol/L Final    Anion Gap 11/14/2022 16  9 - 17 mmol/L Final    Alkaline Phosphatase 11/14/2022 73  35 - 104 U/L Final    ALT 11/14/2022 13  5 - 33 U/L Final    AST 11/14/2022 24  <32 U/L Final    Total Bilirubin 11/14/2022 0.4  0.3 - 1.2 mg/dL Final    Total Protein 11/14/2022 7.2  6.4 - 8.3 g/dL Final    Albumin 11/14/2022 4.5  3.5 - 5.2 g/dL Final    Albumin/Globulin Ratio 11/14/2022 1.7  1.0 - 2.5 Final    TSH 11/14/2022 0.50  0.30 - 5.00 uIU/mL Final    Cholesterol, Fasting 11/14/2022 228 (A)  <200 mg/dL Final    Comment:    Cholesterol Guidelines:      <200  Desirable   200-240  Borderline      >240  Undesirable         HDL 11/14/2022 101  >40 mg/dL Final Comment:    HDL Guidelines:    <40     Undesirable   40-59    Borderline    >59     Desirable         LDL Cholesterol 11/14/2022 104  0 - 130 mg/dL Final    Comment:    LDL Guidelines:     <100    Desirable   100-129   Near to/above Desirable   130-159   Borderline      >159   Undesirable     Direct (measured) LDL and calculated LDL are not interchangeable tests. Chol/HDL Ratio 11/14/2022 2.3  <5 Final            Triglyceride, Fasting 11/14/2022 115  <150 mg/dL Final    Comment:    Triglyceride Guidelines:     <150   Desirable   150-199  Borderline   200-499  High     >499   Very high   Based on AHA Guidelines for fasting triglyceride, October 2012. No results found for this visit on 02/27/23. Completed Orders/Prescriptions   No orders of the defined types were placed in this encounter. AssessmentPlan/Medical Decision Making     1. JESSE on CPAP    - Sleep Study with PAP Titration; Future    Return if symptoms worsen or fail to improve. 1.  Martin received counseling on the following healthy behaviors: n/a  2. Patient given educational materials - see patient instructions  3. Was a self-tracking handout given in paper form or via [employer]? No  If yes, see orders or list here. 4.  Discussed use, benefit, and side effects of prescribed medications. Barriers to medication compliance addressed. All patient questions answered. Pt voiced understanding. 5.  Reviewed prior labs, imaging, consultation, follow up, and health maintenance  6. Continue current medications, diet and exercise. 7. Discussed use, benefit, and side effects of prescribed medications. Barriers to medication compliance addressed. All her questions were answered. Pt voiced understanding. Maggie Dueñas will continue current medications, diet and exercise.       Of the  20  minute duration appointment visit, Rinku Pendleton CNP spent at least 50% of the face-to-face time in counseling, explanation of diagnosis, planning of further management, and answering all questions.           Signed:  Pedro Montgomery, CNP

## 2023-05-21 ENCOUNTER — HOSPITAL ENCOUNTER (EMERGENCY)
Age: 58
Discharge: HOME | End: 2023-05-21
Attending: EMERGENCY MEDICINE
Payer: COMMERCIAL

## 2023-05-21 ENCOUNTER — APPOINTMENT (OUTPATIENT)
Dept: GENERAL RADIOLOGY | Age: 58
End: 2023-05-21
Payer: COMMERCIAL

## 2023-05-21 VITALS
SYSTOLIC BLOOD PRESSURE: 115 MMHG | TEMPERATURE: 97.9 F | RESPIRATION RATE: 18 BRPM | DIASTOLIC BLOOD PRESSURE: 73 MMHG | HEART RATE: 82 BPM | OXYGEN SATURATION: 94 %

## 2023-05-21 DIAGNOSIS — J02.9 ACUTE PHARYNGITIS, UNSPECIFIED ETIOLOGY: ICD-10-CM

## 2023-05-21 DIAGNOSIS — J18.9 PNEUMONIA DUE TO INFECTIOUS ORGANISM, UNSPECIFIED LATERALITY, UNSPECIFIED PART OF LUNG: Primary | ICD-10-CM

## 2023-05-21 LAB
S PYO AG THROAT QL: NEGATIVE
SPECIMEN SOURCE: NORMAL

## 2023-05-21 PROCEDURE — 99284 EMERGENCY DEPT VISIT MOD MDM: CPT

## 2023-05-21 PROCEDURE — 71045 X-RAY EXAM CHEST 1 VIEW: CPT

## 2023-05-21 PROCEDURE — 87880 STREP A ASSAY W/OPTIC: CPT

## 2023-05-21 RX ORDER — ONDANSETRON 4 MG/1
4 TABLET, ORALLY DISINTEGRATING ORAL 3 TIMES DAILY PRN
Qty: 21 TABLET | Refills: 0 | Status: SHIPPED | OUTPATIENT
Start: 2023-05-21

## 2023-05-21 RX ORDER — PREDNISONE 50 MG/1
50 TABLET ORAL DAILY
Qty: 5 TABLET | Refills: 0 | Status: SHIPPED | OUTPATIENT
Start: 2023-05-21 | End: 2023-05-26

## 2023-05-21 RX ORDER — AMOXICILLIN 500 MG/1
500 CAPSULE ORAL 3 TIMES DAILY
Qty: 21 CAPSULE | Refills: 0 | Status: SHIPPED | OUTPATIENT
Start: 2023-05-21 | End: 2023-05-28

## 2023-05-21 RX ORDER — AZITHROMYCIN 250 MG/1
250 TABLET, FILM COATED ORAL SEE ADMIN INSTRUCTIONS
Qty: 6 TABLET | Refills: 0 | Status: SHIPPED | OUTPATIENT
Start: 2023-05-21 | End: 2023-05-26

## 2023-05-21 ASSESSMENT — PAIN SCALES - GENERAL: PAINLEVEL_OUTOF10: 8

## 2023-05-21 ASSESSMENT — PAIN - FUNCTIONAL ASSESSMENT: PAIN_FUNCTIONAL_ASSESSMENT: 0-10

## 2023-05-25 ENCOUNTER — OFFICE VISIT (OUTPATIENT)
Dept: FAMILY MEDICINE CLINIC | Age: 58
End: 2023-05-25
Payer: COMMERCIAL

## 2023-05-25 VITALS
DIASTOLIC BLOOD PRESSURE: 80 MMHG | TEMPERATURE: 97.2 F | SYSTOLIC BLOOD PRESSURE: 140 MMHG | OXYGEN SATURATION: 98 % | HEART RATE: 80 BPM | BODY MASS INDEX: 37.6 KG/M2 | WEIGHT: 199 LBS

## 2023-05-25 DIAGNOSIS — J18.9 PNEUMONIA DUE TO INFECTIOUS ORGANISM, UNSPECIFIED LATERALITY, UNSPECIFIED PART OF LUNG: ICD-10-CM

## 2023-05-25 DIAGNOSIS — Z09 HOSPITAL DISCHARGE FOLLOW-UP: Primary | ICD-10-CM

## 2023-05-25 PROCEDURE — 99214 OFFICE O/P EST MOD 30 MIN: CPT | Performed by: NURSE PRACTITIONER

## 2023-05-25 PROCEDURE — 1111F DSCHRG MED/CURRENT MED MERGE: CPT | Performed by: NURSE PRACTITIONER

## 2023-05-25 RX ORDER — ALBUTEROL SULFATE 90 UG/1
2 AEROSOL, METERED RESPIRATORY (INHALATION) 4 TIMES DAILY PRN
Qty: 18 G | Refills: 0 | Status: SHIPPED | OUTPATIENT
Start: 2023-05-25

## 2023-05-25 ASSESSMENT — PATIENT HEALTH QUESTIONNAIRE - PHQ9
SUM OF ALL RESPONSES TO PHQ QUESTIONS 1-9: 0
SUM OF ALL RESPONSES TO PHQ9 QUESTIONS 1 & 2: 0
SUM OF ALL RESPONSES TO PHQ QUESTIONS 1-9: 0
6. FEELING BAD ABOUT YOURSELF - OR THAT YOU ARE A FAILURE OR HAVE LET YOURSELF OR YOUR FAMILY DOWN: 0
3. TROUBLE FALLING OR STAYING ASLEEP: 0
1. LITTLE INTEREST OR PLEASURE IN DOING THINGS: 0
SUM OF ALL RESPONSES TO PHQ QUESTIONS 1-9: 0
SUM OF ALL RESPONSES TO PHQ QUESTIONS 1-9: 0
8. MOVING OR SPEAKING SO SLOWLY THAT OTHER PEOPLE COULD HAVE NOTICED. OR THE OPPOSITE, BEING SO FIGETY OR RESTLESS THAT YOU HAVE BEEN MOVING AROUND A LOT MORE THAN USUAL: 0
2. FEELING DOWN, DEPRESSED OR HOPELESS: 0
10. IF YOU CHECKED OFF ANY PROBLEMS, HOW DIFFICULT HAVE THESE PROBLEMS MADE IT FOR YOU TO DO YOUR WORK, TAKE CARE OF THINGS AT HOME, OR GET ALONG WITH OTHER PEOPLE: 0
9. THOUGHTS THAT YOU WOULD BE BETTER OFF DEAD, OR OF HURTING YOURSELF: 0
4. FEELING TIRED OR HAVING LITTLE ENERGY: 0
7. TROUBLE CONCENTRATING ON THINGS, SUCH AS READING THE NEWSPAPER OR WATCHING TELEVISION: 0
5. POOR APPETITE OR OVEREATING: 0

## 2023-05-25 ASSESSMENT — ENCOUNTER SYMPTOMS
COUGH: 1
SHORTNESS OF BREATH: 1
NAUSEA: 1

## 2023-05-25 NOTE — PROGRESS NOTES
Post-Discharge Transitional Care Follow Up      Prince Win   YOB: 1965    Date of Office Visit:  5/25/2023  Date of Hospital Admission: 5/21/23  Date of Hospital Discharge: 5/21/23  Readmission Risk Score (high >=14%. Medium >=10%):No data recorded    Care management risk score Rising risk (score 2-5) and Complex Care (Scores >=6): No Risk Score On File     Non face to face  following discharge, date last encounter closed (first attempt may have been earlier): *No documented post hospital discharge outreach found in the last 14 days     Call initiated 2 business days of discharge: *No response recorded in the last 14 days     Hospital discharge follow-up  -     [facility] DISCHARGE MEDS RECONCILED W/ CURRENT OUTPATIENT MED LIST  Pneumonia due to infectious organism, unspecified laterality, unspecified part of lung  -     albuterol sulfate HFA (VENTOLIN HFA) 108 (90 Base) MCG/ACT inhaler; Inhale 2 puffs into the lungs 4 times daily as needed for Wheezing, Disp-18 g, R-0Normal  -     OTC mucinex, delsym and Flonase for symptoms.   -     Continue antibiotics as prescribed    Medical Decision Making: moderate complexity  Return in about 1 week (around 6/1/2023) for f/u pneumonia. On this date 5/25/2023 I have spent 30 minutes reviewing previous notes, test results and face to face with the patient discussing the diagnosis and importance of compliance with the treatment plan as well as documenting on the day of the visit. Subjective:   HPI    Inpatient course: Discharge summary reviewed- see chart. Interval history/Current status:     Patient here for ED follow up [facility]. Reports that she was diagnosed with pneumonia. She was prescribed antibiotics and steroids.  CXR:    HISTORY:  ORDERING SYSTEM PROVIDED HISTORY: cough, congestion  TECHNOLOGIST PROVIDED HISTORY:  cough, congestion  Reason for Exam: cough, congestion  Additional signs and symptoms: cough, congestion     FINDINGS:  Cardiac and

## 2023-05-30 ENCOUNTER — TELEPHONE (OUTPATIENT)
Dept: FAMILY MEDICINE CLINIC | Age: 58
End: 2023-05-30

## 2023-05-30 NOTE — TELEPHONE ENCOUNTER
[place] paperwork was sent over last week. Patient called to check the status and confirm we have received it. I informed the patient we ask for 10-14 days to complete. She is requesting to be completed at Thursdays appointment.  Please confirm [phone]

## 2023-06-01 ENCOUNTER — HOSPITAL ENCOUNTER (OUTPATIENT)
Dept: GENERAL RADIOLOGY | Age: 58
Discharge: HOME | End: 2023-06-03
Payer: COMMERCIAL

## 2023-06-01 ENCOUNTER — OFFICE VISIT (OUTPATIENT)
Dept: FAMILY MEDICINE CLINIC | Age: 58
End: 2023-06-01
Payer: COMMERCIAL

## 2023-06-01 ENCOUNTER — HOSPITAL ENCOUNTER (OUTPATIENT)
Age: 58
Discharge: HOME | End: 2023-06-01
Payer: COMMERCIAL

## 2023-06-01 ENCOUNTER — HOSPITAL ENCOUNTER (OUTPATIENT)
Age: 58
Setting detail: SPECIMEN
Discharge: HOME | End: 2023-06-01

## 2023-06-01 VITALS
DIASTOLIC BLOOD PRESSURE: 70 MMHG | HEART RATE: 83 BPM | BODY MASS INDEX: 37.22 KG/M2 | WEIGHT: 197 LBS | TEMPERATURE: 97.7 F | OXYGEN SATURATION: 97 % | SYSTOLIC BLOOD PRESSURE: 100 MMHG

## 2023-06-01 DIAGNOSIS — Z09 FOLLOW-UP EXAM AFTER TREATMENT: Primary | ICD-10-CM

## 2023-06-01 DIAGNOSIS — J18.9 PNEUMONIA DUE TO INFECTIOUS ORGANISM, UNSPECIFIED LATERALITY, UNSPECIFIED PART OF LUNG: ICD-10-CM

## 2023-06-01 LAB
ANION GAP SERPL CALCULATED.3IONS-SCNC: 19 MMOL/L (ref 9–17)
BASOPHILS # BLD: <0.03 K/UL (ref 0–0.2)
BASOPHILS NFR BLD: 0 % (ref 0–2)
BUN SERPL-MCNC: 12 MG/DL (ref 6–20)
CALCIUM SERPL-MCNC: 9.6 MG/DL (ref 8.6–10.4)
CHLORIDE SERPL-SCNC: 101 MMOL/L (ref 98–107)
CO2 SERPL-SCNC: 20 MMOL/L (ref 20–31)
CREAT SERPL-MCNC: 0.67 MG/DL (ref 0.5–0.9)
EOSINOPHIL # BLD: 0.33 K/UL (ref 0–0.44)
EOSINOPHILS RELATIVE PERCENT: 6 % (ref 1–4)
ERYTHROCYTE [DISTWIDTH] IN BLOOD BY AUTOMATED COUNT: 13.5 % (ref 11.8–14.4)
GFR SERPL CREATININE-BSD FRML MDRD: >60 ML/MIN/1.73M2
GLUCOSE SERPL-MCNC: 123 MG/DL (ref 70–99)
HCT VFR BLD AUTO: 41 % (ref 36.3–47.1)
HGB BLD-MCNC: 12.8 G/DL (ref 11.9–15.1)
IMM GRANULOCYTES # BLD AUTO: <0.03 K/UL (ref 0–0.3)
IMM GRANULOCYTES NFR BLD: 0 %
LYMPHOCYTES # BLD: 38 % (ref 24–43)
LYMPHOCYTES NFR BLD: 2.27 K/UL (ref 1.1–3.7)
MCH RBC QN AUTO: 27.2 PG (ref 25.2–33.5)
MCHC RBC AUTO-ENTMCNC: 31.2 G/DL (ref 28.4–34.8)
MCV RBC AUTO: 87.2 FL (ref 82.6–102.9)
MONOCYTES NFR BLD: 0.59 K/UL (ref 0.1–1.2)
MONOCYTES NFR BLD: 10 % (ref 3–12)
NEUTROPHILS NFR BLD: 46 % (ref 36–65)
NEUTS SEG NFR BLD: 2.69 K/UL (ref 1.5–8.1)
NRBC AUTOMATED: 0 PER 100 WBC
PLATELET # BLD AUTO: 258 K/UL (ref 138–453)
PMV BLD AUTO: 10.9 FL (ref 8.1–13.5)
POTASSIUM SERPL-SCNC: 4.3 MMOL/L (ref 3.7–5.3)
RBC # BLD AUTO: 4.7 M/UL (ref 3.95–5.11)
SODIUM SERPL-SCNC: 140 MMOL/L (ref 135–144)
WBC OTHER # BLD: 5.9 K/UL (ref 3.5–11.3)

## 2023-06-01 PROCEDURE — 71046 X-RAY EXAM CHEST 2 VIEWS: CPT

## 2023-06-01 PROCEDURE — 99214 OFFICE O/P EST MOD 30 MIN: CPT | Performed by: NURSE PRACTITIONER

## 2023-06-01 ASSESSMENT — PATIENT HEALTH QUESTIONNAIRE - PHQ9
SUM OF ALL RESPONSES TO PHQ QUESTIONS 1-9: 0
1. LITTLE INTEREST OR PLEASURE IN DOING THINGS: 0
10. IF YOU CHECKED OFF ANY PROBLEMS, HOW DIFFICULT HAVE THESE PROBLEMS MADE IT FOR YOU TO DO YOUR WORK, TAKE CARE OF THINGS AT HOME, OR GET ALONG WITH OTHER PEOPLE: 0
4. FEELING TIRED OR HAVING LITTLE ENERGY: 0
8. MOVING OR SPEAKING SO SLOWLY THAT OTHER PEOPLE COULD HAVE NOTICED. OR THE OPPOSITE, BEING SO FIGETY OR RESTLESS THAT YOU HAVE BEEN MOVING AROUND A LOT MORE THAN USUAL: 0
SUM OF ALL RESPONSES TO PHQ QUESTIONS 1-9: 0
9. THOUGHTS THAT YOU WOULD BE BETTER OFF DEAD, OR OF HURTING YOURSELF: 0
5. POOR APPETITE OR OVEREATING: 0
SUM OF ALL RESPONSES TO PHQ9 QUESTIONS 1 & 2: 0
3. TROUBLE FALLING OR STAYING ASLEEP: 0
6. FEELING BAD ABOUT YOURSELF - OR THAT YOU ARE A FAILURE OR HAVE LET YOURSELF OR YOUR FAMILY DOWN: 0
SUM OF ALL RESPONSES TO PHQ QUESTIONS 1-9: 0
SUM OF ALL RESPONSES TO PHQ QUESTIONS 1-9: 0
7. TROUBLE CONCENTRATING ON THINGS, SUCH AS READING THE NEWSPAPER OR WATCHING TELEVISION: 0
2. FEELING DOWN, DEPRESSED OR HOPELESS: 0

## 2023-06-01 ASSESSMENT — ENCOUNTER SYMPTOMS
COUGH: 1
SHORTNESS OF BREATH: 1

## 2023-06-01 NOTE — PROGRESS NOTES
ALLERGIES:  No Known Allergies       Subjective   Review of Systems   Constitutional:  Positive for chills, diaphoresis and fatigue. HENT:  Positive for congestion. Respiratory:  Positive for cough and shortness of breath. All other systems reviewed and are negative. Objective   Physical Exam  Pulmonary:      Effort: Pulmonary effort is normal.      Breath sounds: Examination of the left-middle field reveals decreased breath sounds. Examination of the right-lower field reveals decreased breath sounds. Examination of the left-lower field reveals decreased breath sounds. Decreased breath sounds present. Constitutional: Luis Portillo is oriented to person, place, and time. Vital signs are normal. Appears well-developed and well-nourished. Head: Normocephalic and atraumatic. Eyes:PERRL, EOMI, Conjunctiva normal, No discharge. Neck: Full passive range of motion. Non-tender on palpation. Neck supple. No thyromegaly present. Trachea normal.  Cardiovascular: Normal rate, regular rhythm, S1, S2, no murmur, no gallop, no friction rub, intact distal pulses. Musculoskeletal: Extremities appear regular and symmetric. No evident masses, lesions, foreign bodies, or other abnormalities. No edema. No tenderness on palpation. Joints are stable. Full ROM, strength and tone are within normal limits. Neurological: Alert and oriented to person, place, and time. Normal motor function, Normal sensory function, No focal deficits noted. He has normal strength. Skin: Skin is warm, dry and intact. No obvious lesions on exposed skin  Psychiatric: Normal mood and affect. Speech is normal and behavior is normal.     Nursing note and vitals reviewed. Blood pressure 100/70, pulse 83, temperature 97.7 °F (36.5 °C), temperature source Temporal, weight 197 lb (89.4 kg), last menstrual period 01/08/2016, SpO2 97 %, not currently breastfeeding. Body mass index is 37.22 kg/m².     Wt Readings from Last 3 Encounters:

## 2023-08-28 ENCOUNTER — TELEPHONE (OUTPATIENT)
Dept: FAMILY MEDICINE CLINIC | Age: 58
End: 2023-08-28

## 2023-08-28 DIAGNOSIS — G47.33 OSA ON CPAP: Primary | ICD-10-CM

## 2023-08-28 DIAGNOSIS — Z99.89 OSA ON CPAP: Primary | ICD-10-CM

## 2023-08-28 NOTE — TELEPHONE ENCOUNTER
[facility] [employer] [phone] needs a new order for a CPAP machine. Pt's machine needs to be replaced. It has out lived it's motor life as the pt put it.     Thank you,    Daniela Taveras

## 2023-08-29 NOTE — TELEPHONE ENCOUNTER
Please find out all patients current settings on machine.  I have a pended order but need more information

## 2023-10-19 ENCOUNTER — HOSPITAL ENCOUNTER (EMERGENCY)
Age: 58
Discharge: HOME | End: 2023-10-19
Attending: EMERGENCY MEDICINE
Payer: COMMERCIAL

## 2023-10-19 VITALS
HEART RATE: 104 BPM | OXYGEN SATURATION: 97 % | TEMPERATURE: 100.2 F | SYSTOLIC BLOOD PRESSURE: 150 MMHG | BODY MASS INDEX: 34.01 KG/M2 | WEIGHT: 180 LBS | RESPIRATION RATE: 16 BRPM | DIASTOLIC BLOOD PRESSURE: 58 MMHG

## 2023-10-19 DIAGNOSIS — U07.1 COVID-19: Primary | ICD-10-CM

## 2023-10-19 PROCEDURE — 99282 EMERGENCY DEPT VISIT SF MDM: CPT

## 2023-10-19 ASSESSMENT — ENCOUNTER SYMPTOMS
ABDOMINAL PAIN: 0
RHINORRHEA: 1
WHEEZING: 0
VOMITING: 0
SHORTNESS OF BREATH: 0
SORE THROAT: 0
COUGH: 1
NAUSEA: 0

## 2023-10-19 ASSESSMENT — PAIN SCALES - GENERAL: PAINLEVEL_OUTOF10: 6

## 2023-10-19 ASSESSMENT — PAIN DESCRIPTION - DESCRIPTORS: DESCRIPTORS: ACHING

## 2023-10-19 ASSESSMENT — PAIN - FUNCTIONAL ASSESSMENT: PAIN_FUNCTIONAL_ASSESSMENT: 0-10

## 2023-10-19 ASSESSMENT — PAIN DESCRIPTION - FREQUENCY: FREQUENCY: CONTINUOUS

## 2023-10-19 ASSESSMENT — PAIN DESCRIPTION - LOCATION: LOCATION: GENERALIZED

## 2023-10-19 NOTE — ED PROVIDER NOTES
patient. Any medications that may have been ordered were discussed with the patient. I have reviewed the patient's previous medical records using the electronic health record that we have available. Patient presents to the ER for a work note. She has had a cough rhinorrhea fatigue fever nasal congestion started yesterday and tested positive at home for COVID today. She is nondistressed. Lung sounds clear to auscultation. Chest x-ray not indicated. She is taking over-the-counter medications for symptoms. Work note provided. Discussed follow-up care with the patient. Evaluation and treatment course in the ED, and plan of care upon discharge was discussed in length with the patient. Patient had no further questions prior to being discharged and was instructed to return to the ED for new or worsening symptoms. CONSULTS:  None    PROCEDURES:  Procedures    FINAL IMPRESSION      1. COVID-19            Problem List  Patient Active Problem List   Diagnosis Code    Depression F32. A    JESSE on CPAP G47.33    Obesity (BMI 35.0-39.9 without comorbidity) E66.9         DISPOSITION/PLAN   DISPOSITION Decision To Discharge 10/19/2023 05:40:11 PM      PATIENT REFERRED TO:   BRYANT Herrera CNP  [address] [address]  [phone]    In 1 week      [facility] ED  [address]  [phone]    If symptoms worsen      DISCHARGE MEDICATIONS:     New Prescriptions    No medications on file           (Please note that portions of this note were completed with a voice recognition program.  Efforts were made to edit the dictations but occasionally words are mis-transcribed.)    BRYANT Sandhu CNP, APRN - CNP  10/19/23 [id number]

## 2023-10-19 NOTE — DISCHARGE INSTRUCTIONS
Follow-up with your primary care provider in 1 week. Return to emergency department for worsening or new symptoms.

## 2023-10-24 ENCOUNTER — TELEPHONE (OUTPATIENT)
Dept: FAMILY MEDICINE CLINIC | Age: 58
End: 2023-10-24

## 2023-10-24 NOTE — TELEPHONE ENCOUNTER
[facility] called stating they spoke with patient and she does not want to go forward with CPaP and supplies due to cost

## 2023-10-25 ENCOUNTER — TELEPHONE (OUTPATIENT)
Dept: FAMILY MEDICINE CLINIC | Age: 58
End: 2023-10-25

## 2023-10-25 NOTE — TELEPHONE ENCOUNTER
Left message for pt to return call to [facility]. E.R F/U Appointment  seen pt's message requesting Appt.

## 2023-11-08 ENCOUNTER — TELEPHONE (OUTPATIENT)
Dept: FAMILY MEDICINE CLINIC | Age: 58
End: 2023-11-08

## 2023-11-08 NOTE — TELEPHONE ENCOUNTER
Left message for pt to return call Asking what The FMLA forms Jerry Britt Received are for, Last was [employer]por[employer].  ( Form is in  hold Folder by monik)

## 2023-11-13 ENCOUNTER — OFFICE VISIT (OUTPATIENT)
Dept: FAMILY MEDICINE CLINIC | Age: 58
End: 2023-11-13
Payer: COMMERCIAL

## 2023-11-13 VITALS
WEIGHT: 199.4 LBS | HEART RATE: 91 BPM | OXYGEN SATURATION: 97 % | BODY MASS INDEX: 37.68 KG/M2 | SYSTOLIC BLOOD PRESSURE: 102 MMHG | TEMPERATURE: 97.7 F | DIASTOLIC BLOOD PRESSURE: 70 MMHG

## 2023-11-13 DIAGNOSIS — Z00.00 ENCOUNTER FOR WELL ADULT EXAM WITHOUT ABNORMAL FINDINGS: Primary | ICD-10-CM

## 2023-11-13 DIAGNOSIS — G47.33 OSA ON CPAP: ICD-10-CM

## 2023-11-13 PROCEDURE — 99396 PREV VISIT EST AGE 40-64: CPT | Performed by: NURSE PRACTITIONER

## 2023-11-13 ASSESSMENT — PATIENT HEALTH QUESTIONNAIRE - PHQ9
SUM OF ALL RESPONSES TO PHQ9 QUESTIONS 1 & 2: 0
10. IF YOU CHECKED OFF ANY PROBLEMS, HOW DIFFICULT HAVE THESE PROBLEMS MADE IT FOR YOU TO DO YOUR WORK, TAKE CARE OF THINGS AT HOME, OR GET ALONG WITH OTHER PEOPLE: 0
5. POOR APPETITE OR OVEREATING: 0
3. TROUBLE FALLING OR STAYING ASLEEP: 0
SUM OF ALL RESPONSES TO PHQ QUESTIONS 1-9: 0
2. FEELING DOWN, DEPRESSED OR HOPELESS: 0
7. TROUBLE CONCENTRATING ON THINGS, SUCH AS READING THE NEWSPAPER OR WATCHING TELEVISION: 0
4. FEELING TIRED OR HAVING LITTLE ENERGY: 0
9. THOUGHTS THAT YOU WOULD BE BETTER OFF DEAD, OR OF HURTING YOURSELF: 0
1. LITTLE INTEREST OR PLEASURE IN DOING THINGS: 0
SUM OF ALL RESPONSES TO PHQ QUESTIONS 1-9: 0
8. MOVING OR SPEAKING SO SLOWLY THAT OTHER PEOPLE COULD HAVE NOTICED. OR THE OPPOSITE, BEING SO FIGETY OR RESTLESS THAT YOU HAVE BEEN MOVING AROUND A LOT MORE THAN USUAL: 0
6. FEELING BAD ABOUT YOURSELF - OR THAT YOU ARE A FAILURE OR HAVE LET YOURSELF OR YOUR FAMILY DOWN: 0
SUM OF ALL RESPONSES TO PHQ QUESTIONS 1-9: 0
SUM OF ALL RESPONSES TO PHQ QUESTIONS 1-9: 0

## 2023-11-13 ASSESSMENT — COLUMBIA-SUICIDE SEVERITY RATING SCALE - C-SSRS
7. DID THIS OCCUR IN THE LAST THREE MONTHS: NO
3. HAVE YOU BEEN THINKING ABOUT HOW YOU MIGHT KILL YOURSELF?: NO
5. HAVE YOU STARTED TO WORK OUT OR WORKED OUT THE DETAILS OF HOW TO KILL YOURSELF? DO YOU INTEND TO CARRY OUT THIS PLAN?: NO
4. HAVE YOU HAD THESE THOUGHTS AND HAD SOME INTENTION OF ACTING ON THEM?: NO

## 2023-11-13 NOTE — PROGRESS NOTES
Well Adult Note  Name: Syd Yoon Date: 2023   MRN: [id number] Sex: Female   Age: 60 y.o. Ethnicity: Non- / Non    : 1965 Race: Black / Gabby Granados is here for well adult exam.  History:    Patient has been doing well. Exercise: 3 times per week  Diet: Eats a balanced diet- little red meat  Smoker: No  Alcohol: No  Sleep: Averages >8 hours with CPAP but needs a new machine. Review of Systems   All other systems reviewed and are negative. No Known Allergies      Prior to Visit Medications    Medication Sig Taking? Authorizing Provider   albuterol sulfate HFA (VENTOLIN HFA) 108 (90 Base) MCG/ACT inhaler Inhale 2 puffs into the lungs 4 times daily as needed for Wheezing Yes Kwan Cohen APRN - CNP   CPAP Machine MISC by Does not apply route CPAP supplies Yes Kwan Cohen APRN - CNP   ELDERBERRY PO Take 1 each by mouth Yes Beltran Garcia MD   magnesium 30 MG tablet Take 1 tablet by mouth nightly as needed Yes Beltran Garcia MD   turmeric 500 MG CAPS Take by mouth Yes Beltran Garcia MD   Ascorbic Acid (TREVOR-C PO) Take by mouth Yes ProviderBeltran MD   ibuprofen (ADVIL;MOTRIN) 800 MG tablet Take 1 tablet by mouth every 8 hours as needed for Pain Yes Sean Aguila MD   Multiple Vitamins-Minerals (THERAPEUTIC MULTIVITAMIN-MINERALS) tablet Take 1 tablet by mouth daily Yes Beltran Garcia MD   ondansetron (ZOFRAN-ODT) 4 MG disintegrating tablet Take 1 tablet by mouth 3 times daily as needed for Nausea or Vomiting  Patient not taking: Reported on 2023  Susan Kaplan MD         Past Medical History:   Diagnosis Date    Arthritis     Calcium kidney stone     history of.     Depression     Hematuria     Obesity (BMI 35.0-39.9 without comorbidity) 3/30/2016    Sleep apnea     + Cpap mask       Past Surgical History:   Procedure Laterality Date     SECTION      x3    COLONOSCOPY N/A 2021

## 2023-11-24 ENCOUNTER — TELEPHONE (OUTPATIENT)
Dept: FAMILY MEDICINE CLINIC | Age: 58
End: 2023-11-24

## 2023-11-24 NOTE — TELEPHONE ENCOUNTER
Patient called complaining of cough since last Wednesday. Producing clear and yellow mucus. Fatigue and no appetite. Negative for Covid. Wants something for the cough. Please advise.

## 2024-01-09 ENCOUNTER — HOSPITAL ENCOUNTER (EMERGENCY)
Age: 59
Discharge: HOME | End: 2024-01-10
Attending: STUDENT IN AN ORGANIZED HEALTH CARE EDUCATION/TRAINING PROGRAM
Payer: COMMERCIAL

## 2024-01-09 ENCOUNTER — APPOINTMENT (OUTPATIENT)
Dept: GENERAL RADIOLOGY | Age: 59
End: 2024-01-09
Payer: COMMERCIAL
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
## 2024-01-09 DIAGNOSIS — R07.89 CHEST WALL PAIN: ICD-10-CM

## 2024-01-09 DIAGNOSIS — S46.911A STRAIN OF RIGHT SHOULDER, INITIAL ENCOUNTER: ICD-10-CM

## 2024-01-09 DIAGNOSIS — S39.012A BACK STRAIN, INITIAL ENCOUNTER: ICD-10-CM

## 2024-01-09 LAB
BASOPHILS # BLD: 0 K/UL (ref 0–0.2)
BASOPHILS NFR BLD: 1 % (ref 0–2)
EOSINOPHIL # BLD: 0.2 K/UL (ref 0–0.4)
EOSINOPHILS RELATIVE PERCENT: 4 % (ref 0–4)
ERYTHROCYTE [DISTWIDTH] IN BLOOD BY AUTOMATED COUNT: 13.9 % (ref 11.5–14.9)
HCT VFR BLD AUTO: 37.6 % (ref 36–46)
HGB BLD-MCNC: 12.4 G/DL (ref 12–16)
INR PPP: 0.9
LYMPHOCYTES NFR BLD: 2.6 K/UL (ref 1–4.8)
LYMPHOCYTES RELATIVE PERCENT: 40 % (ref 24–44)
MCH RBC QN AUTO: 27.6 PG (ref 26–34)
MCHC RBC AUTO-ENTMCNC: 33 G/DL (ref 31–37)
MCV RBC AUTO: 83.8 FL (ref 80–100)
MONOCYTES NFR BLD: 0.5 K/UL (ref 0.1–1.3)
MONOCYTES NFR BLD: 8 % (ref 1–7)
NEUTROPHILS NFR BLD: 47 % (ref 36–66)
NEUTS SEG NFR BLD: 3.1 K/UL (ref 1.3–9.1)
PLATELET # BLD AUTO: 241 K/UL (ref 150–450)
PMV BLD AUTO: 8.8 FL (ref 6–12)
PROTHROMBIN TIME: 12.2 SEC (ref 11.8–14.6)
RBC # BLD AUTO: 4.48 M/UL (ref 4–5.2)
WBC OTHER # BLD: 6.5 K/UL (ref 3.5–11)

## 2024-01-09 PROCEDURE — 71045 X-RAY EXAM CHEST 1 VIEW: CPT

## 2024-01-09 PROCEDURE — 84484 ASSAY OF TROPONIN QUANT: CPT

## 2024-01-09 PROCEDURE — 72072 X-RAY EXAM THORAC SPINE 3VWS: CPT

## 2024-01-09 PROCEDURE — 36415 COLL VENOUS BLD VENIPUNCTURE: CPT

## 2024-01-09 PROCEDURE — 6370000000 HC RX 637 (ALT 250 FOR IP): Performed by: STUDENT IN AN ORGANIZED HEALTH CARE EDUCATION/TRAINING PROGRAM

## 2024-01-09 PROCEDURE — 99285 EMERGENCY DEPT VISIT HI MDM: CPT

## 2024-01-09 PROCEDURE — 73502 X-RAY EXAM HIP UNI 2-3 VIEWS: CPT

## 2024-01-09 PROCEDURE — 93005 ELECTROCARDIOGRAM TRACING: CPT | Performed by: STUDENT IN AN ORGANIZED HEALTH CARE EDUCATION/TRAINING PROGRAM

## 2024-01-09 PROCEDURE — 85025 COMPLETE CBC W/AUTO DIFF WBC: CPT

## 2024-01-09 PROCEDURE — 85610 PROTHROMBIN TIME: CPT

## 2024-01-09 PROCEDURE — 73030 X-RAY EXAM OF SHOULDER: CPT

## 2024-01-09 PROCEDURE — 80048 BASIC METABOLIC PNL TOTAL CA: CPT

## 2024-01-09 PROCEDURE — 83735 ASSAY OF MAGNESIUM: CPT

## 2024-01-09 RX ORDER — IBUPROFEN 800 MG/1
800 TABLET ORAL ONCE
Status: COMPLETED | OUTPATIENT
Start: 2024-01-09 | End: 2024-01-09

## 2024-01-09 RX ORDER — METHOCARBAMOL 750 MG/1
750 TABLET, FILM COATED ORAL ONCE
Status: COMPLETED | OUTPATIENT
Start: 2024-01-09 | End: 2024-01-09

## 2024-01-09 RX ORDER — ACETAMINOPHEN 500 MG
1000 TABLET ORAL ONCE
Status: COMPLETED | OUTPATIENT
Start: 2024-01-09 | End: 2024-01-09

## 2024-01-09 RX ADMIN — ACETAMINOPHEN 1000 MG: 500 TABLET ORAL at 22:35

## 2024-01-09 RX ADMIN — IBUPROFEN 800 MG: 800 TABLET, FILM COATED ORAL at 22:35

## 2024-01-09 RX ADMIN — METHOCARBAMOL 750 MG: 750 TABLET ORAL at 22:35

## 2024-01-09 ASSESSMENT — ENCOUNTER SYMPTOMS
EYE DISCHARGE: 0
SORE THROAT: 0
VOMITING: 0
SHORTNESS OF BREATH: 0
ABDOMINAL PAIN: 0
NAUSEA: 0
EYE REDNESS: 0
DIARRHEA: 0
RHINORRHEA: 0

## 2024-01-09 ASSESSMENT — LIFESTYLE VARIABLES
HOW OFTEN DO YOU HAVE A DRINK CONTAINING ALCOHOL: NEVER
HOW MANY STANDARD DRINKS CONTAINING ALCOHOL DO YOU HAVE ON A TYPICAL DAY: PATIENT DOES NOT DRINK

## 2024-01-09 ASSESSMENT — PAIN SCALES - GENERAL: PAINLEVEL_OUTOF10: 6

## 2024-01-10 VITALS
DIASTOLIC BLOOD PRESSURE: 78 MMHG | SYSTOLIC BLOOD PRESSURE: 118 MMHG | RESPIRATION RATE: 15 BRPM | BODY MASS INDEX: 35.87 KG/M2 | HEIGHT: 61 IN | WEIGHT: 190 LBS | TEMPERATURE: 98 F | OXYGEN SATURATION: 97 % | HEART RATE: 67 BPM

## 2024-01-10 LAB
ANION GAP SERPL CALCULATED.3IONS-SCNC: 10 MMOL/L (ref 9–17)
BUN SERPL-MCNC: 10 MG/DL (ref 6–20)
CALCIUM SERPL-MCNC: 9 MG/DL (ref 8.6–10.4)
CHLORIDE SERPL-SCNC: 103 MMOL/L (ref 98–107)
CO2 SERPL-SCNC: 26 MMOL/L (ref 20–31)
CREAT SERPL-MCNC: 0.7 MG/DL (ref 0.5–0.9)
GFR SERPL CREATININE-BSD FRML MDRD: >60 ML/MIN/1.73M2
GLUCOSE SERPL-MCNC: 102 MG/DL (ref 70–99)
MAGNESIUM SERPL-MCNC: 2.1 MG/DL (ref 1.6–2.6)
POTASSIUM SERPL-SCNC: 3.7 MMOL/L (ref 3.7–5.3)
SODIUM SERPL-SCNC: 139 MMOL/L (ref 135–144)
TROPONIN I SERPL HS-MCNC: 7 NG/L (ref 0–14)

## 2024-01-10 RX ORDER — IBUPROFEN 600 MG/1
600 TABLET ORAL 3 TIMES DAILY PRN
Qty: 30 TABLET | Refills: 0 | Status: SHIPPED | OUTPATIENT
Start: 2024-01-10

## 2024-01-10 RX ORDER — METHOCARBAMOL 750 MG/1
750 TABLET, FILM COATED ORAL 3 TIMES DAILY
Qty: 21 TABLET | Refills: 0 | Status: SHIPPED | OUTPATIENT
Start: 2024-01-10 | End: 2024-01-17

## 2024-01-10 NOTE — ED PROVIDER NOTES
EMERGENCY DEPARTMENT ENCOUNTER    Pt Name: Martin Khan  MRN: [id number]  Birthdate 1965  Date of evaluation: 1/9/24  CHIEF COMPLAINT       Chief Complaint   Patient presents with    Hypertension    Illness    Fall    Chest Pain     HISTORY OF PRESENT ILLNESS   58-year-old presents after a fall    States few days ago she went to sit on a bench when it collapsed and she fell backwards    She does not believe she hit her head or loss consciousness    Since she has been having some discomfort in her chest, right shoulder, right hip, upper thoracic back area    Feels very sore    No numbness tingling weakness headaches neck pain or other pain in the extremities    She states that her blood pressure has been going high then coming back down and she is somewhat concerned about this              REVIEW OF SYSTEMS     Review of Systems   Constitutional:  Negative for chills and fever.   HENT:  Negative for rhinorrhea and sore throat.    Eyes:  Negative for discharge and redness.   Respiratory:  Negative for shortness of breath.    Cardiovascular:  Positive for chest pain.   Gastrointestinal:  Negative for abdominal pain, diarrhea, nausea and vomiting.   Genitourinary:  Negative for dysuria, frequency and urgency.   Musculoskeletal:  Negative for arthralgias and myalgias.        Right shoulder pain, right hip pain, upper back pain   Skin:  Negative for rash.   Neurological:  Negative for weakness and numbness.   Psychiatric/Behavioral:  Negative for suicidal ideas.    All other systems reviewed and are negative.    PASTMEDICAL HISTORY     Past Medical History:   Diagnosis Date    Arthritis     Calcium kidney stone     history of.    Depression     Hematuria     Obesity (BMI 35.0-39.9 without comorbidity) 3/30/2016    Sleep apnea     + Cpap mask     Past Problem List  Patient Active Problem List   Diagnosis Code    Depression F32.A    JESSE on CPAP G47.33    Obesity (BMI 35.0-39.9 without comorbidity) E66.9     SURGICAL

## 2024-01-10 NOTE — ED NOTES
Mode of arrival (squad #, walk in, police, etc) : Walk in         Chief complaint(s): hypertension, illness         Arrival Note (brief scenario, treatment PTA, etc).: Pt states falling on Saturday. Since then she states eye twitching and illness. Pt states she took her BP at home and it was high no hx of hypertension. Pt states feeling chest tightness since then.         C= \"Have you ever felt that you should Cut down on your drinking?\"  No  A= \"Have people Annoyed you by criticizing your drinking?\"  No  G= \"Have you ever felt bad or Guilty about your drinking?\"  No  E= \"Have you ever had a drink as an Eye-opener first thing in the morning to steady your nerves or to help a hangover?\"  No      Deferred []      Reason for deferring: N/A    *If yes to two or more: probable alcohol abuse.*

## 2024-01-11 LAB
EKG ATRIAL RATE: 67 BPM
EKG P AXIS: 61 DEGREES
EKG P-R INTERVAL: 164 MS
EKG Q-T INTERVAL: 402 MS
EKG QRS DURATION: 74 MS
EKG QTC CALCULATION (BAZETT): 424 MS
EKG R AXIS: -27 DEGREES
EKG T AXIS: 21 DEGREES
EKG VENTRICULAR RATE: 67 BPM

## 2024-02-26 ENCOUNTER — HOSPITAL ENCOUNTER (OUTPATIENT)
Age: 59
Setting detail: SPECIMEN
Discharge: HOME | End: 2024-02-26

## 2024-02-26 ENCOUNTER — HOSPITAL ENCOUNTER (OUTPATIENT)
Dept: MAMMOGRAPHY | Age: 59
Discharge: HOME | End: 2024-02-28
Payer: COMMERCIAL

## 2024-02-26 DIAGNOSIS — Z00.00 ENCOUNTER FOR WELL ADULT EXAM WITHOUT ABNORMAL FINDINGS: ICD-10-CM

## 2024-02-26 DIAGNOSIS — Z12.31 ENCOUNTER FOR SCREENING MAMMOGRAM FOR BREAST CANCER: ICD-10-CM

## 2024-02-26 LAB
ALBUMIN SERPL-MCNC: 4 G/DL (ref 3.5–5.2)
ALBUMIN/GLOB SERPL: 2 {RATIO} (ref 1–2.5)
ALP SERPL-CCNC: 75 U/L (ref 35–104)
ALT SERPL-CCNC: 13 U/L (ref 10–35)
ANION GAP SERPL CALCULATED.3IONS-SCNC: 12 MMOL/L (ref 9–16)
AST SERPL-CCNC: 18 U/L (ref 10–35)
BASOPHILS # BLD: <0.03 K/UL (ref 0–0.2)
BASOPHILS NFR BLD: 0 % (ref 0–2)
BILIRUB SERPL-MCNC: 0.4 MG/DL (ref 0–1.2)
BUN SERPL-MCNC: 10 MG/DL (ref 6–20)
CALCIUM SERPL-MCNC: 8.8 MG/DL (ref 8.6–10.4)
CHLORIDE SERPL-SCNC: 108 MMOL/L (ref 98–107)
CHOLEST SERPL-MCNC: 211 MG/DL (ref 0–199)
CHOLESTEROL/HDL RATIO: 2
CO2 SERPL-SCNC: 21 MMOL/L (ref 20–31)
CREAT SERPL-MCNC: 0.7 MG/DL (ref 0.5–0.9)
EOSINOPHIL # BLD: 0.19 K/UL (ref 0–0.44)
EOSINOPHILS RELATIVE PERCENT: 5 % (ref 1–4)
ERYTHROCYTE [DISTWIDTH] IN BLOOD BY AUTOMATED COUNT: 13.5 % (ref 11.8–14.4)
GFR SERPL CREATININE-BSD FRML MDRD: >60 ML/MIN/1.73M2
GLUCOSE SERPL-MCNC: 102 MG/DL (ref 74–99)
HCT VFR BLD AUTO: 39 % (ref 36.3–47.1)
HDLC SERPL-MCNC: 93 MG/DL
HGB BLD-MCNC: 11.9 G/DL (ref 11.9–15.1)
IMM GRANULOCYTES # BLD AUTO: <0.03 K/UL (ref 0–0.3)
IMM GRANULOCYTES NFR BLD: 0 %
LDLC SERPL CALC-MCNC: 101 MG/DL (ref 0–100)
LYMPHOCYTES NFR BLD: 1.67 K/UL (ref 1.1–3.7)
LYMPHOCYTES RELATIVE PERCENT: 39 % (ref 24–43)
MCH RBC QN AUTO: 27.1 PG (ref 25.2–33.5)
MCHC RBC AUTO-ENTMCNC: 30.5 G/DL (ref 28.4–34.8)
MCV RBC AUTO: 88.8 FL (ref 82.6–102.9)
MONOCYTES NFR BLD: 0.45 K/UL (ref 0.1–1.2)
MONOCYTES NFR BLD: 11 % (ref 3–12)
NEUTROPHILS NFR BLD: 45 % (ref 36–65)
NEUTS SEG NFR BLD: 1.93 K/UL (ref 1.5–8.1)
NRBC BLD-RTO: 0 PER 100 WBC
PLATELET # BLD AUTO: 224 K/UL (ref 138–453)
PMV BLD AUTO: 11.6 FL (ref 8.1–13.5)
POTASSIUM SERPL-SCNC: 3.9 MMOL/L (ref 3.7–5.3)
PROT SERPL-MCNC: 6.6 G/DL (ref 6.6–8.7)
RBC # BLD AUTO: 4.39 M/UL (ref 3.95–5.11)
SODIUM SERPL-SCNC: 141 MMOL/L (ref 136–145)
TRIGL SERPL-MCNC: 87 MG/DL (ref 0–149)
TSH SERPL DL<=0.05 MIU/L-ACNC: 0.37 UIU/ML (ref 0.27–4.2)
VLDLC SERPL CALC-MCNC: 17 MG/DL
WBC OTHER # BLD: 4.3 K/UL (ref 3.5–11.3)

## 2024-02-26 PROCEDURE — 77063 BREAST TOMOSYNTHESIS BI: CPT

## 2024-06-03 ENCOUNTER — APPOINTMENT (OUTPATIENT)
Dept: GENERAL RADIOLOGY | Age: 59
End: 2024-06-03
Payer: COMMERCIAL

## 2024-06-03 ENCOUNTER — HOSPITAL ENCOUNTER (EMERGENCY)
Age: 59
Discharge: HOME | End: 2024-06-03
Attending: EMERGENCY MEDICINE
Payer: COMMERCIAL

## 2024-06-03 VITALS
HEART RATE: 72 BPM | WEIGHT: 180 LBS | SYSTOLIC BLOOD PRESSURE: 118 MMHG | OXYGEN SATURATION: 100 % | HEIGHT: 61 IN | DIASTOLIC BLOOD PRESSURE: 79 MMHG | TEMPERATURE: 98.4 F | RESPIRATION RATE: 16 BRPM | BODY MASS INDEX: 33.99 KG/M2

## 2024-06-03 DIAGNOSIS — J98.4 HILAR DENSITY: ICD-10-CM

## 2024-06-03 DIAGNOSIS — J01.00 ACUTE NON-RECURRENT MAXILLARY SINUSITIS: Primary | ICD-10-CM

## 2024-06-03 DIAGNOSIS — R05.1 ACUTE COUGH: ICD-10-CM

## 2024-06-03 PROCEDURE — 71046 X-RAY EXAM CHEST 2 VIEWS: CPT

## 2024-06-03 PROCEDURE — 99283 EMERGENCY DEPT VISIT LOW MDM: CPT

## 2024-06-03 RX ORDER — AMOXICILLIN AND CLAVULANATE POTASSIUM 875; 125 MG/1; MG/1
1 TABLET, FILM COATED ORAL 2 TIMES DAILY
Qty: 20 TABLET | Refills: 0 | Status: SHIPPED | OUTPATIENT
Start: 2024-06-03 | End: 2024-06-13

## 2024-06-03 RX ORDER — BENZONATATE 100 MG/1
100-200 CAPSULE ORAL 3 TIMES DAILY PRN
Qty: 21 CAPSULE | Refills: 0 | Status: SHIPPED | OUTPATIENT
Start: 2024-06-03 | End: 2024-06-10

## 2024-06-03 ASSESSMENT — PAIN - FUNCTIONAL ASSESSMENT: PAIN_FUNCTIONAL_ASSESSMENT: NONE - DENIES PAIN

## 2024-06-03 ASSESSMENT — VISUAL ACUITY: OU: 1

## 2024-06-03 NOTE — DISCHARGE INSTRUCTIONS
Please follow up with the PCP.  You will need outpatient ct scan of chest to further evaluate for granuloma vs lymph node.  Return to the ED if you develop chest pain, shortness of breath, fever, worsening cough, congestion or any other concerning symptoms.

## 2024-06-04 NOTE — ED PROVIDER NOTES
eMERGENCY dEPARTMENT eNCOUnter   Independent Attestation     Pt Name: Martin Khan  MRN: [id number]  Birthdate 1965  Date of evaluation: 6/3/24     Martin Khan is a 59 y.o. female with CC: Cough and Emesis (Pt here with cough, vomiting, concerned for pneumonia, denies SOB or CP)      Based on the medical record the care appears appropriate.  I was personally available for consultation in the Emergency Department.    Felisha Sood MD  Attending Emergency Physician                  Felisah Sood MD  06/03/24 [id number]    
Head: Normocephalic and atraumatic.      Right Ear: Hearing, tympanic membrane, ear canal and external ear normal. No laceration, drainage, swelling or tenderness. No middle ear effusion. There is no impacted cerumen. No mastoid tenderness. No hemotympanum. Tympanic membrane is not injected, scarred, perforated, erythematous, retracted or bulging.      Left Ear: Hearing, tympanic membrane, ear canal and external ear normal. No laceration, drainage, swelling or tenderness.  No middle ear effusion. There is no impacted cerumen. No mastoid tenderness. No hemotympanum. Tympanic membrane is not injected, scarred, perforated, erythematous, retracted or bulging.      Nose: Congestion present.      Mouth/Throat:      Pharynx: Oropharynx is clear. Uvula midline. Posterior oropharyngeal erythema present. No pharyngeal swelling, oropharyngeal exudate or uvula swelling.      Tonsils: No tonsillar exudate or tonsillar abscesses.   Eyes:      General: Lids are normal. Vision grossly intact. Gaze aligned appropriately.      Pupils: Pupils are equal, round, and reactive to light.   Cardiovascular:      Rate and Rhythm: Normal rate and regular rhythm.      Pulses: Normal pulses.      Heart sounds: Normal heart sounds, S1 normal and S2 normal.   Pulmonary:      Effort: Pulmonary effort is normal. No respiratory distress.      Breath sounds: Normal breath sounds and air entry. No stridor. No wheezing, rhonchi or rales.   Chest:      Chest wall: No tenderness.   Abdominal:      General: Abdomen is flat.      Palpations: Abdomen is soft.      Tenderness: There is no abdominal tenderness. There is no guarding or rebound.   Musculoskeletal:         General: Normal range of motion.      Cervical back: Full passive range of motion without pain.   Skin:     General: Skin is warm.      Capillary Refill: Capillary refill takes less than 2 seconds.      Findings: No rash.   Neurological:      General: No focal deficit present.      Mental

## 2024-07-01 ENCOUNTER — OFFICE VISIT (OUTPATIENT)
Dept: FAMILY MEDICINE CLINIC | Age: 59
End: 2024-07-01
Payer: COMMERCIAL

## 2024-07-01 VITALS
OXYGEN SATURATION: 98 % | DIASTOLIC BLOOD PRESSURE: 80 MMHG | SYSTOLIC BLOOD PRESSURE: 118 MMHG | TEMPERATURE: 97.5 F | BODY MASS INDEX: 37.64 KG/M2 | HEART RATE: 91 BPM | WEIGHT: 199.2 LBS

## 2024-07-01 DIAGNOSIS — R05.1 ACUTE COUGH: ICD-10-CM

## 2024-07-01 DIAGNOSIS — R93.89 ABNORMAL CHEST X-RAY: Primary | ICD-10-CM

## 2024-07-01 PROCEDURE — 99214 OFFICE O/P EST MOD 30 MIN: CPT | Performed by: NURSE PRACTITIONER

## 2024-07-01 SDOH — ECONOMIC STABILITY: INCOME INSECURITY: HOW HARD IS IT FOR YOU TO PAY FOR THE VERY BASICS LIKE FOOD, HOUSING, MEDICAL CARE, AND HEATING?: NOT HARD AT ALL

## 2024-07-01 SDOH — ECONOMIC STABILITY: FOOD INSECURITY: WITHIN THE PAST 12 MONTHS, YOU WORRIED THAT YOUR FOOD WOULD RUN OUT BEFORE YOU GOT MONEY TO BUY MORE.: NEVER TRUE

## 2024-07-01 SDOH — ECONOMIC STABILITY: FOOD INSECURITY: WITHIN THE PAST 12 MONTHS, THE FOOD YOU BOUGHT JUST DIDN'T LAST AND YOU DIDN'T HAVE MONEY TO GET MORE.: NEVER TRUE

## 2024-07-01 ASSESSMENT — PATIENT HEALTH QUESTIONNAIRE - PHQ9
9. THOUGHTS THAT YOU WOULD BE BETTER OFF DEAD, OR OF HURTING YOURSELF: NOT AT ALL
6. FEELING BAD ABOUT YOURSELF - OR THAT YOU ARE A FAILURE OR HAVE LET YOURSELF OR YOUR FAMILY DOWN: NOT AT ALL
4. FEELING TIRED OR HAVING LITTLE ENERGY: NOT AT ALL
3. TROUBLE FALLING OR STAYING ASLEEP: NOT AT ALL
7. TROUBLE CONCENTRATING ON THINGS, SUCH AS READING THE NEWSPAPER OR WATCHING TELEVISION: NOT AT ALL
SUM OF ALL RESPONSES TO PHQ QUESTIONS 1-9: 0
8. MOVING OR SPEAKING SO SLOWLY THAT OTHER PEOPLE COULD HAVE NOTICED. OR THE OPPOSITE, BEING SO FIGETY OR RESTLESS THAT YOU HAVE BEEN MOVING AROUND A LOT MORE THAN USUAL: NOT AT ALL
SUM OF ALL RESPONSES TO PHQ QUESTIONS 1-9: 0
SUM OF ALL RESPONSES TO PHQ QUESTIONS 1-9: 0
5. POOR APPETITE OR OVEREATING: NOT AT ALL
2. FEELING DOWN, DEPRESSED OR HOPELESS: NOT AT ALL
SUM OF ALL RESPONSES TO PHQ QUESTIONS 1-9: 0
10. IF YOU CHECKED OFF ANY PROBLEMS, HOW DIFFICULT HAVE THESE PROBLEMS MADE IT FOR YOU TO DO YOUR WORK, TAKE CARE OF THINGS AT HOME, OR GET ALONG WITH OTHER PEOPLE: NOT DIFFICULT AT ALL
1. LITTLE INTEREST OR PLEASURE IN DOING THINGS: NOT AT ALL
SUM OF ALL RESPONSES TO PHQ9 QUESTIONS 1 & 2: 0

## 2024-07-01 ASSESSMENT — ENCOUNTER SYMPTOMS
COUGH: 1
SHORTNESS OF BREATH: 0

## 2024-07-01 NOTE — PROGRESS NOTES
Final    Neutrophils % 02/26/2024 45  36 - 65 % Final    Lymphocytes % 02/26/2024 39  24 - 43 % Final    Monocytes % 02/26/2024 11  3 - 12 % Final    Eosinophils % 02/26/2024 5 (H)  1 - 4 % Final    Basophils % 02/26/2024 0  0 - 2 % Final    Immature Granulocytes % 02/26/2024 0  0 % Final    Neutrophils Absolute 02/26/2024 1.93  1.50 - 8.10 k/uL Final    Lymphocytes Absolute 02/26/2024 1.67  1.10 - 3.70 k/uL Final    Monocytes Absolute 02/26/2024 0.45  0.10 - 1.20 k/uL Final    Eosinophils Absolute 02/26/2024 0.19  0.00 - 0.44 k/uL Final    Basophils Absolute 02/26/2024 <0.03  0.00 - 0.20 k/uL Final    Immature Granulocytes Absolute 02/26/2024 <0.03  0.00 - 0.30 k/uL Final    Sodium 02/26/2024 141  136 - 145 mmol/L Final    Potassium 02/26/2024 3.9  3.7 - 5.3 mmol/L Final    Chloride 02/26/2024 108 (H)  98 - 107 mmol/L Final    CO2 02/26/2024 21  20 - 31 mmol/L Final    Anion Gap 02/26/2024 12  9 - 16 mmol/L Final    Glucose 02/26/2024 102 (H)  74 - 99 mg/dL Final    BUN 02/26/2024 10  6 - 20 mg/dL Final    Creatinine 02/26/2024 0.7  0.50 - 0.90 mg/dL Final    Est, Glom Filt Rate 02/26/2024 >60  >60 mL/min/1.73m2 Final    Comment:       These results are not intended for use in patients <18 years of age.        eGFR results are calculated without a race factor using the 2021 CKD-EPI equation.  Careful clinical correlation is recommended, particularly when comparing to results   calculated using previous equations.  The CKD-EPI equation is less accurate in patients with extremes of muscle mass, extra-renal   metabolism of creatine, excessive creatine ingestion, or following therapy that affects   renal tubular secretion.      Calcium 02/26/2024 8.8  8.6 - 10.4 mg/dL Final    Total Protein 02/26/2024 6.6  6.6 - 8.7 g/dL Final    Albumin 02/26/2024 4.0  3.5 - 5.2 g/dL Final    Albumin/Globulin Ratio 02/26/2024 2.0  1.0 - 2.5 Final    Total Bilirubin 02/26/2024 0.4  0.00 - 1.20 mg/dL Final    Alkaline Phosphatase

## 2024-07-08 ENCOUNTER — HOSPITAL ENCOUNTER (OUTPATIENT)
Dept: CT IMAGING | Age: 59
Discharge: HOME | End: 2024-07-10
Payer: COMMERCIAL

## 2024-07-08 DIAGNOSIS — R93.89 ABNORMAL CHEST X-RAY: ICD-10-CM

## 2024-07-08 DIAGNOSIS — R05.1 ACUTE COUGH: ICD-10-CM

## 2024-07-08 PROCEDURE — 71250 CT THORAX DX C-: CPT

## 2024-07-09 DIAGNOSIS — R91.1 LUNG NODULE SEEN ON IMAGING STUDY: Primary | ICD-10-CM

## 2024-07-10 ENCOUNTER — OFFICE VISIT (OUTPATIENT)
Dept: PULMONOLOGY | Age: 59
End: 2024-07-10
Payer: COMMERCIAL

## 2024-07-10 VITALS
HEIGHT: 61 IN | DIASTOLIC BLOOD PRESSURE: 87 MMHG | SYSTOLIC BLOOD PRESSURE: 128 MMHG | HEART RATE: 86 BPM | RESPIRATION RATE: 18 BRPM | WEIGHT: 198.4 LBS | OXYGEN SATURATION: 97 % | BODY MASS INDEX: 37.46 KG/M2

## 2024-07-10 DIAGNOSIS — R91.8 ABNORMAL CT SCAN OF LUNG: ICD-10-CM

## 2024-07-10 DIAGNOSIS — R91.1 NODULE OF UPPER LOBE OF RIGHT LUNG: Primary | ICD-10-CM

## 2024-07-10 DIAGNOSIS — E66.9 OBESITY (BMI 30.0-34.9): ICD-10-CM

## 2024-07-10 DIAGNOSIS — G47.33 OSA (OBSTRUCTIVE SLEEP APNEA): ICD-10-CM

## 2024-07-10 PROCEDURE — 99204 OFFICE O/P NEW MOD 45 MIN: CPT | Performed by: INTERNAL MEDICINE

## 2024-07-10 ASSESSMENT — SLEEP AND FATIGUE QUESTIONNAIRES
HOW LIKELY ARE YOU TO NOD OFF OR FALL ASLEEP WHILE SITTING INACTIVE IN A PUBLIC PLACE: SLIGHT CHANCE OF DOZING
HOW LIKELY ARE YOU TO NOD OFF OR FALL ASLEEP WHILE WATCHING TV: SLIGHT CHANCE OF DOZING
ESS TOTAL SCORE: 9
HOW LIKELY ARE YOU TO NOD OFF OR FALL ASLEEP WHILE SITTING QUIETLY AFTER LUNCH WITHOUT ALCOHOL: SLIGHT CHANCE OF DOZING
HOW LIKELY ARE YOU TO NOD OFF OR FALL ASLEEP WHEN YOU ARE A PASSENGER IN A CAR FOR AN HOUR WITHOUT A BREAK: SLIGHT CHANCE OF DOZING
HOW LIKELY ARE YOU TO NOD OFF OR FALL ASLEEP IN A CAR, WHILE STOPPED FOR A FEW MINUTES IN TRAFFIC: SLIGHT CHANCE OF DOZING
HOW LIKELY ARE YOU TO NOD OFF OR FALL ASLEEP WHILE LYING DOWN TO REST IN THE AFTERNOON WHEN CIRCUMSTANCES PERMIT: MODERATE CHANCE OF DOZING
HOW LIKELY ARE YOU TO NOD OFF OR FALL ASLEEP WHILE SITTING AND READING: SLIGHT CHANCE OF DOZING
HOW LIKELY ARE YOU TO NOD OFF OR FALL ASLEEP WHILE SITTING AND TALKING TO SOMEONE: SLIGHT CHANCE OF DOZING

## 2024-07-10 NOTE — PROGRESS NOTES
OUTPATIENT PULMONARY CONSULT NOTE      Patient:  Martin Khan  MRN: [id number]    Consulting Physician: Suki Cohen APRN - CNP  Reason for Consult: Abnormal CT/right upper lobe nodule/infiltrate  Primacy Care Physician: Suki Cohen APRN - CNP    HISTORY OF PRESENT ILLNESS:   The patient is a 59 y.o. female she is referred here for abnormal CT scan/right upper lobe nodule/infiltrate.    According to patient she had history of COVID/according to patient pneumonia in June 2023 when she had a chest x-ray done at that time.  Her symptoms resolved.  In January 2024 she had a fall and she went to emergency room and for trauma workup she had a chest x-ray done.  On 06/03/2024 she presented to emergency room with 2-day history of cold-like symptoms according to patient she was having cough congestion and she thought that she was having similar pneumonialike symptoms like she had June 2023, other people at work have the sickness also, she had a chest x-ray done which was suggestive of right upper lobe area of vague opacity which was reported by radiology was present on chest x-ray 01/09/2024.  A CT scan of the chest was done on 07/08/2024 2 days ago and she was referred here.    CT scan of the chest shows a 2.4 X 2.1 cm opacity in the apical right upper lobe peripheral with central lucency there was no lymphadenopathy was reported but was without IV contrast.    According patient she does not have history of asthma she denies shortness of breath.  She does not have cough.  She denies chronic cough sputum production wheezing chest tightness or chest pain.  Patient's symptoms in early June was only for 4 days and it had resolved completely.  She does not complain of any weight loss fever night sweats hemoptysis.    She denies history of smoking and significant history of exposure to smoking.  She working at [employer].    She does have history of sleep apnea and was diagnosed in December 2015 with sleep study which shows

## 2024-07-11 NOTE — PATIENT INSTRUCTIONS
7/11/24 Pt will take SD card from her old Cpap to DME or  our office for compliance download.   Pt was informed of active lab order, she will get prior to her F/U appointment.   KUNAL

## 2024-07-15 ENCOUNTER — HOSPITAL ENCOUNTER (OUTPATIENT)
Age: 59
Setting detail: SPECIMEN
Discharge: HOME | End: 2024-07-15

## 2024-07-15 DIAGNOSIS — R91.8 ABNORMAL CT SCAN OF LUNG: ICD-10-CM

## 2024-07-15 DIAGNOSIS — R91.1 NODULE OF UPPER LOBE OF RIGHT LUNG: ICD-10-CM

## 2024-07-15 LAB
ALBUMIN SERPL-MCNC: 4.3 G/DL (ref 3.5–5.2)
ALBUMIN/GLOB SERPL: 1 {RATIO} (ref 1–2.5)
ALP SERPL-CCNC: 78 U/L (ref 35–104)
ALT SERPL-CCNC: 12 U/L (ref 10–35)
ANION GAP SERPL CALCULATED.3IONS-SCNC: 11 MMOL/L (ref 9–16)
AST SERPL-CCNC: 17 U/L (ref 10–35)
BASOPHILS # BLD: 0.03 K/UL (ref 0–0.2)
BASOPHILS NFR BLD: 1 % (ref 0–2)
BILIRUB SERPL-MCNC: 0.4 MG/DL (ref 0–1.2)
BUN SERPL-MCNC: 12 MG/DL (ref 8–23)
CALCIUM SERPL-MCNC: 9 MG/DL (ref 8.6–10.4)
CHLORIDE SERPL-SCNC: 106 MMOL/L (ref 98–107)
CO2 SERPL-SCNC: 26 MMOL/L (ref 20–31)
CREAT SERPL-MCNC: 0.8 MG/DL (ref 0.5–0.9)
CRP SERPL HS-MCNC: 7.1 MG/L (ref 0–5)
EOSINOPHIL # BLD: 0.23 K/UL (ref 0–0.44)
EOSINOPHILS RELATIVE PERCENT: 5 % (ref 1–4)
ERYTHROCYTE [DISTWIDTH] IN BLOOD BY AUTOMATED COUNT: 13.2 % (ref 11.8–14.4)
GFR, ESTIMATED: 90 ML/MIN/1.73M2
GLUCOSE SERPL-MCNC: 113 MG/DL (ref 74–99)
HCT VFR BLD AUTO: 41.5 % (ref 36.3–47.1)
HGB BLD-MCNC: 12.9 G/DL (ref 11.9–15.1)
IMM GRANULOCYTES # BLD AUTO: <0.03 K/UL (ref 0–0.3)
IMM GRANULOCYTES NFR BLD: 0 %
LYMPHOCYTES NFR BLD: 1.83 K/UL (ref 1.1–3.7)
LYMPHOCYTES RELATIVE PERCENT: 40 % (ref 24–43)
MCH RBC QN AUTO: 27 PG (ref 25.2–33.5)
MCHC RBC AUTO-ENTMCNC: 31.1 G/DL (ref 28.4–34.8)
MCV RBC AUTO: 86.8 FL (ref 82.6–102.9)
MONOCYTES NFR BLD: 0.4 K/UL (ref 0.1–1.2)
MONOCYTES NFR BLD: 9 % (ref 3–12)
NEUTROPHILS NFR BLD: 45 % (ref 36–65)
NEUTS SEG NFR BLD: 2.13 K/UL (ref 1.5–8.1)
NRBC BLD-RTO: 0 PER 100 WBC
PLATELET # BLD AUTO: 258 K/UL (ref 138–453)
PMV BLD AUTO: 10.9 FL (ref 8.1–13.5)
POTASSIUM SERPL-SCNC: 4.3 MMOL/L (ref 3.7–5.3)
PROT SERPL-MCNC: 7.2 G/DL (ref 6.6–8.7)
RBC # BLD AUTO: 4.78 M/UL (ref 3.95–5.11)
SODIUM SERPL-SCNC: 143 MMOL/L (ref 136–145)
WBC OTHER # BLD: 4.6 K/UL (ref 3.5–11.3)

## 2024-07-23 ENCOUNTER — HOSPITAL ENCOUNTER (OUTPATIENT)
Dept: NUCLEAR MEDICINE | Age: 59
Discharge: HOME | End: 2024-07-25
Attending: INTERNAL MEDICINE
Payer: COMMERCIAL

## 2024-07-23 DIAGNOSIS — R91.1 NODULE OF UPPER LOBE OF RIGHT LUNG: ICD-10-CM

## 2024-07-23 LAB — GLUCOSE BLD-MCNC: 112 MG/DL (ref 65–105)

## 2024-07-23 PROCEDURE — 3430000000 HC RX DIAGNOSTIC RADIOPHARMACEUTICAL: Performed by: INTERNAL MEDICINE

## 2024-07-23 PROCEDURE — 82947 ASSAY GLUCOSE BLOOD QUANT: CPT

## 2024-07-23 PROCEDURE — A9609 HC RX DIAGNOSTIC RADIOPHARMACEUTICAL: HCPCS | Performed by: INTERNAL MEDICINE

## 2024-07-23 PROCEDURE — 2580000003 HC RX 258: Performed by: INTERNAL MEDICINE

## 2024-07-23 PROCEDURE — 78815 PET IMAGE W/CT SKULL-THIGH: CPT

## 2024-07-23 RX ORDER — FLUDEOXYGLUCOSE F 18 200 MCI/ML
10 INJECTION, SOLUTION INTRAVENOUS
Status: COMPLETED | OUTPATIENT
Start: 2024-07-23 | End: 2024-07-23

## 2024-07-23 RX ORDER — SODIUM CHLORIDE 0.9 % (FLUSH) 0.9 %
10 SYRINGE (ML) INJECTION ONCE
Status: COMPLETED | OUTPATIENT
Start: 2024-07-23 | End: 2024-07-23

## 2024-07-23 RX ADMIN — FLUDEOXYGLUCOSE F 18 11.7 MILLICURIE: 200 INJECTION, SOLUTION INTRAVENOUS at 09:47

## 2024-07-23 RX ADMIN — SODIUM CHLORIDE, PRESERVATIVE FREE 10 ML: 5 INJECTION INTRAVENOUS at 10:33

## 2024-08-05 ENCOUNTER — OFFICE VISIT (OUTPATIENT)
Dept: PULMONOLOGY | Age: 59
End: 2024-08-05
Payer: COMMERCIAL

## 2024-08-05 VITALS
WEIGHT: 190 LBS | DIASTOLIC BLOOD PRESSURE: 88 MMHG | HEART RATE: 74 BPM | BODY MASS INDEX: 35.87 KG/M2 | HEIGHT: 61 IN | RESPIRATION RATE: 18 BRPM | SYSTOLIC BLOOD PRESSURE: 139 MMHG | OXYGEN SATURATION: 100 %

## 2024-08-05 DIAGNOSIS — R91.8 ABNORMAL CT SCAN OF LUNG: ICD-10-CM

## 2024-08-05 DIAGNOSIS — G47.33 OSA (OBSTRUCTIVE SLEEP APNEA): ICD-10-CM

## 2024-08-05 DIAGNOSIS — E66.9 OBESITY (BMI 30.0-34.9): ICD-10-CM

## 2024-08-05 DIAGNOSIS — R91.1 NODULE OF UPPER LOBE OF RIGHT LUNG: Primary | ICD-10-CM

## 2024-08-05 PROCEDURE — 99214 OFFICE O/P EST MOD 30 MIN: CPT | Performed by: INTERNAL MEDICINE

## 2024-08-05 RX ORDER — PREDNISONE 50 MG/1
1 TABLET ORAL DAILY
COMMUNITY

## 2024-08-05 RX ORDER — AMOXICILLIN 500 MG/1
CAPSULE ORAL
COMMUNITY

## 2024-08-05 RX ORDER — BROMPHENIRAMINE MALEATE, PSEUDOEPHEDRINE HYDROCHLORIDE, AND DEXTROMETHORPHAN HYDROBROMIDE 2; 30; 10 MG/5ML; MG/5ML; MG/5ML
SYRUP ORAL
COMMUNITY

## 2024-08-05 NOTE — PROGRESS NOTES
at work, driving, reading, watching television, talking, disrupted sleep, naps        Physical Exam:    Vitals: /88   Pulse 74   Resp 18   Ht 1.549 m (5' 1\")   Wt 86.2 kg (190 lb)   LMP 01/08/2016   SpO2 100% Comment: ra  BMI 35.90 kg/m²   Last 3 weights:   Wt Readings from Last 3 Encounters:   08/05/24 86.2 kg (190 lb)   07/10/24 90 kg (198 lb 6.4 oz)   07/01/24 90.4 kg (199 lb 3.2 oz)     Body mass index is 35.9 kg/m².    Physical Examination:   General appearance - alert, well appearing, and in no distress and acyanotic, in no respiratory distress  Mental status - alert, oriented to person, place, and time  Eyes - pupils equal and reactive, extraocular eye movements intact, sclera anicteric  Ears - right ear normal, left ear normal  Nose - normal and patent, no erythema, discharge or polyps  Mouth - mucous membranes moist, pharynx normal without lesions and large tongue Mallampati 2  Neck - supple, no significant adenopathy  Chest - no tachypnea, retractions or cyanosis bilateral symmetrical chest movement, normal resonance on percussion, air entry is present bilaterally and symmetrical, no expiratory wheezing rhonchi or crackles.  Heart - normal rate, regular rhythm, normal S1, S2, no murmurs, rubs, clicks or gallops  Abdomen - soft, nontender, nondistended, no masses or organomegaly  Neurological - alert, oriented, normal speech, no focal findings or movement disorder noted}  Extremities - peripheral pulses normal, no pedal edema, no clubbing or cyanosis  Skin - normal coloration and turgor, no rashes, no suspicious skin lesions noted       LABS:    CBC:   WBC   Date Value Ref Range Status   07/15/2024 4.6 3.5 - 11.3 k/uL Final   02/26/2024 4.3 3.5 - 11.3 k/uL Final   01/09/2024 6.5 3.5 - 11.0 k/uL Final     Hemoglobin   Date Value Ref Range Status   07/15/2024 12.9 11.9 - 15.1 g/dL Final   02/26/2024 11.9 11.9 - 15.1 g/dL Final   01/09/2024 12.4 12.0 - 16.0 g/dL Final     Platelets   Date Value

## 2024-08-06 NOTE — PATIENT INSTRUCTIONS
Called IR to verify receipt of Bx order - writer pointed out additional Fungus Culture , Margarito will call pt to schedule.   Writer will make f/u appt in office once Bx is scheduled  LS

## 2024-08-07 ENCOUNTER — TELEPHONE (OUTPATIENT)
Dept: INTERVENTIONAL RADIOLOGY/VASCULAR | Age: 59
End: 2024-08-07

## 2024-09-09 ENCOUNTER — TELEPHONE (OUTPATIENT)
Dept: PULMONOLOGY | Age: 59
End: 2024-09-09

## 2024-09-09 NOTE — TELEPHONE ENCOUNTER
Pt was scheduled for f/u lung biopsy - per  previous IR note, IR could not reach pt- message was left for he to call.      Pt no showed her f/u appt - writer attempted to call patient - there was no answer.   Writer will call pt again in the morning.

## 2024-09-10 NOTE — TELEPHONE ENCOUNTER
Writer called pt 9/10/24 - per patient, she didn't have transportation.  She has been in contact with IR and has every intention on scheduling her biopsy.  She was asked to call office once scheduled so a follow up appointment could be made.   Pt voiced understanding and thanked me/ Dr Sanon for the call.

## 2024-09-17 ENCOUNTER — TELEPHONE (OUTPATIENT)
Dept: INTERVENTIONAL RADIOLOGY/VASCULAR | Age: 59
End: 2024-09-17

## 2024-09-18 NOTE — TELEPHONE ENCOUNTER
Chari tried calling pt again - phone encounter created.   Writer called pt, pt states her daughter will be in town the following week.  Pt will call IR back then.

## 2024-10-09 NOTE — TELEPHONE ENCOUNTER
Left voicemail message asking pt to call office to schedule an appointment for Dr Sanon.   Mailed letter asking pt to call office to schedule an appt.

## 2024-11-13 ENCOUNTER — TELEPHONE (OUTPATIENT)
Dept: PULMONOLOGY | Age: 59
End: 2024-11-13

## 2024-11-13 NOTE — TELEPHONE ENCOUNTER
Dr Sanon called office, with the new information that Martin canceled her Bx & requesting an appt with him to discuss options, he is requesting a repeat CT scan prior to her appt.  Writer called pt to inform her of this, she was at work and asked if writer could call her number back and leave the scheduling phone number on her voicemail.

## 2024-11-25 ENCOUNTER — HOSPITAL ENCOUNTER (OUTPATIENT)
Dept: CT IMAGING | Age: 59
Discharge: HOME | End: 2024-11-27
Attending: INTERNAL MEDICINE
Payer: COMMERCIAL

## 2024-11-25 DIAGNOSIS — R91.8 MASS OF UPPER LOBE OF RIGHT LUNG: ICD-10-CM

## 2024-11-25 PROCEDURE — 71250 CT THORAX DX C-: CPT

## 2024-11-26 ENCOUNTER — TELEPHONE (OUTPATIENT)
Dept: INTERVENTIONAL RADIOLOGY/VASCULAR | Age: 59
End: 2024-11-26

## 2024-11-27 ENCOUNTER — OFFICE VISIT (OUTPATIENT)
Dept: PULMONOLOGY | Age: 59
End: 2024-11-27
Payer: COMMERCIAL

## 2024-11-27 VITALS
RESPIRATION RATE: 16 BRPM | WEIGHT: 191.4 LBS | BODY MASS INDEX: 36.14 KG/M2 | HEART RATE: 100 BPM | DIASTOLIC BLOOD PRESSURE: 89 MMHG | OXYGEN SATURATION: 98 % | SYSTOLIC BLOOD PRESSURE: 133 MMHG | HEIGHT: 61 IN

## 2024-11-27 DIAGNOSIS — R91.1 NODULE OF UPPER LOBE OF RIGHT LUNG: Primary | ICD-10-CM

## 2024-11-27 DIAGNOSIS — G47.33 OSA (OBSTRUCTIVE SLEEP APNEA): ICD-10-CM

## 2024-11-27 DIAGNOSIS — E66.811 OBESITY (BMI 30.0-34.9): ICD-10-CM

## 2024-11-27 PROCEDURE — 99214 OFFICE O/P EST MOD 30 MIN: CPT | Performed by: INTERNAL MEDICINE

## 2024-11-27 NOTE — PROGRESS NOTES
the patient and images reviewed with the patient.  I have also referred her to interventional etiology and patient is scheduled for CT-guided biopsy in about 10 to 14 days with intervention radiology.  I have discussed with her about the reason for the biopsy has new diagnosis is apparent and also discussed about possible risk involved including pneumothorax and bleeding.    PET CT scan shows mild uptake of SUV of 4 in the right upper lobe consolidative masslike opacity with mild areas of lucency.  I have discussed the PET scan finding with the patient and images reviewed she denies much symptoms at this time she is a non-smoker.  Etiology of this mass could be malignancy, infectious or inflammatory but currently she does not have much symptoms of infection or constriction symptoms.      CT scan of the chest shows right upper lobe opacity with lucency there is a solid component surrounding and there is slightly groundglass component surrounding that area which is about 2.5 cm peripheral in right upper lobe.  Chest x-ray on 06/03/2024 and on reviewing the chest x-ray from 01/10/2024 this opacity was also reported by radiology and according to radiology unchanged on 06/03/2024 as compared to 01/10/2024, and on reviewing the chest x-ray on 06/01/2023 it was not reported but difficult to rule out if it was present and 06/01/2023 but who is opacity likely was present.  Etiology is not clear there is no previous CT scan available to compare with.  She is asymptomatic at this time and denies any chronic symptoms and or any constitutional symptoms.        Plan and recommendation:    Reschedule CT-guided biopsy by interventional etiology.  Will also request aspirate to be sent for fungal and bacterial and mycobacterial culture beside pathology.  New orders placed.  CT scan from 11/25/2024 and 07/08/2024 reviewed  PET scan images reviewed  Patient is currently not symptomatic but need further workup and discussed with

## 2024-12-09 ENCOUNTER — HOSPITAL ENCOUNTER (OUTPATIENT)
Dept: GENERAL RADIOLOGY | Age: 59
Discharge: HOME | End: 2024-12-11
Payer: COMMERCIAL

## 2024-12-09 ENCOUNTER — HOSPITAL ENCOUNTER (OUTPATIENT)
Dept: CT IMAGING | Age: 59
Discharge: HOME | End: 2024-12-11
Payer: COMMERCIAL

## 2024-12-09 VITALS
OXYGEN SATURATION: 100 % | HEIGHT: 61 IN | TEMPERATURE: 97.9 F | DIASTOLIC BLOOD PRESSURE: 82 MMHG | HEART RATE: 58 BPM | RESPIRATION RATE: 20 BRPM | BODY MASS INDEX: 36.06 KG/M2 | WEIGHT: 191 LBS | SYSTOLIC BLOOD PRESSURE: 139 MMHG

## 2024-12-09 DIAGNOSIS — R91.1 NODULE OF UPPER LOBE OF RIGHT LUNG: ICD-10-CM

## 2024-12-09 LAB
INR PPP: 0.9
PARTIAL THROMBOPLASTIN TIME: 20.7 SEC (ref 23–36.5)
PLATELET # BLD AUTO: 213 K/UL (ref 138–453)
PROTHROMBIN TIME: 12.5 SEC (ref 11.7–14.9)

## 2024-12-09 PROCEDURE — 88305 TISSUE EXAM BY PATHOLOGIST: CPT

## 2024-12-09 PROCEDURE — 85610 PROTHROMBIN TIME: CPT

## 2024-12-09 PROCEDURE — 7100000041 HC SPAR PHASE II RECOVERY - ADDTL 15 MIN

## 2024-12-09 PROCEDURE — 87102 FUNGUS ISOLATION CULTURE: CPT

## 2024-12-09 PROCEDURE — 87206 SMEAR FLUORESCENT/ACID STAI: CPT

## 2024-12-09 PROCEDURE — 88334 PATH CONSLTJ SURG CYTO XM EA: CPT

## 2024-12-09 PROCEDURE — 88341 IMHCHEM/IMCYTCHM EA ADD ANTB: CPT

## 2024-12-09 PROCEDURE — 71045 X-RAY EXAM CHEST 1 VIEW: CPT

## 2024-12-09 PROCEDURE — 87116 MYCOBACTERIA CULTURE: CPT

## 2024-12-09 PROCEDURE — 87015 SPECIMEN INFECT AGNT CONCNTJ: CPT

## 2024-12-09 PROCEDURE — 6360000002 HC RX W HCPCS: Performed by: RADIOLOGY

## 2024-12-09 PROCEDURE — 88333 PATH CONSLTJ SURG CYTO XM 1: CPT

## 2024-12-09 PROCEDURE — 85049 AUTOMATED PLATELET COUNT: CPT

## 2024-12-09 PROCEDURE — 88342 IMHCHEM/IMCYTCHM 1ST ANTB: CPT

## 2024-12-09 PROCEDURE — 85730 THROMBOPLASTIN TIME PARTIAL: CPT

## 2024-12-09 PROCEDURE — 7100000040 HC SPAR PHASE II RECOVERY - FIRST 15 MIN

## 2024-12-09 PROCEDURE — 32408 CORE NDL BX LNG/MED PERQ: CPT

## 2024-12-09 RX ORDER — SODIUM CHLORIDE 0.9 % (FLUSH) 0.9 %
5-40 SYRINGE (ML) INJECTION EVERY 12 HOURS SCHEDULED
Status: DISCONTINUED | OUTPATIENT
Start: 2024-12-09 | End: 2024-12-12 | Stop reason: HOSPADM

## 2024-12-09 RX ORDER — ONDANSETRON 2 MG/ML
4 INJECTION INTRAMUSCULAR; INTRAVENOUS ONCE
Status: COMPLETED | OUTPATIENT
Start: 2024-12-09 | End: 2024-12-09

## 2024-12-09 RX ORDER — FENTANYL CITRATE 50 UG/ML
INJECTION, SOLUTION INTRAMUSCULAR; INTRAVENOUS PRN
Status: COMPLETED | OUTPATIENT
Start: 2024-12-09 | End: 2024-12-09

## 2024-12-09 RX ORDER — SODIUM CHLORIDE 9 MG/ML
INJECTION, SOLUTION INTRAVENOUS CONTINUOUS
Status: DISCONTINUED | OUTPATIENT
Start: 2024-12-09 | End: 2024-12-12 | Stop reason: HOSPADM

## 2024-12-09 RX ORDER — SODIUM CHLORIDE 9 MG/ML
INJECTION, SOLUTION INTRAVENOUS PRN
Status: DISCONTINUED | OUTPATIENT
Start: 2024-12-09 | End: 2024-12-12 | Stop reason: HOSPADM

## 2024-12-09 RX ORDER — SODIUM CHLORIDE 0.9 % (FLUSH) 0.9 %
5-40 SYRINGE (ML) INJECTION PRN
Status: DISCONTINUED | OUTPATIENT
Start: 2024-12-09 | End: 2024-12-12 | Stop reason: HOSPADM

## 2024-12-09 RX ADMIN — ONDANSETRON 4 MG: 2 INJECTION INTRAMUSCULAR; INTRAVENOUS at 12:47

## 2024-12-09 RX ADMIN — FENTANYL CITRATE 50 MCG: 50 INJECTION, SOLUTION INTRAMUSCULAR; INTRAVENOUS at 11:45

## 2024-12-09 ASSESSMENT — PAIN - FUNCTIONAL ASSESSMENT
PAIN_FUNCTIONAL_ASSESSMENT: NONE - DENIES PAIN
PAIN_FUNCTIONAL_ASSESSMENT: NONE - DENIES PAIN

## 2024-12-09 NOTE — PRE SEDATION
Sedation Pre-Procedure Note    Patient Name: Martin Khan   YOB: 1965  Room/Bed: Room/bed info not found  Medical Record Number: [id number]  Date: 2024   Time: 11:17 AM       Indication:  Lung Nodule-IR Lung Biopsy    Consent: I have discussed with the patient and/or the patient representative the indication and the possible risks and/or complications of the planned procedure and the anesthesia methods. The patient and/or patient representative appear to understand and agree to proceed.    Vital Signs:   Vitals:    24 0851   BP: 132/82   Pulse: 79   Resp: 20   Temp: 97.9 °F (36.6 °C)   SpO2: 98%       Past Medical History:   has a past medical history of Arthritis, Calcium kidney stone, COVID, Depression, Hematuria, Lung nodule, Obesity (BMI 35.0-39.9 without comorbidity), Pneumonia, and Sleep apnea.    Past Surgical History:   has a past surgical history that includes Tonsillectomy and adenoidectomy; Foot surgery;  section; and Colonoscopy (N/A, 2021).    Medications:   Scheduled Meds:    sodium chloride flush  5-40 mL IntraVENous 2 times per day     Continuous Infusions:    sodium chloride      sodium chloride       PRN Meds: sodium chloride flush, sodium chloride  Home Meds:   Prior to Admission medications    Medication Sig Start Date End Date Taking? Authorizing Provider   ibuprofen (ADVIL;MOTRIN) 600 MG tablet Take 1 tablet by mouth 3 times daily as needed for Pain 1/10/24  Yes Tay Bell MD   albuterol sulfate HFA (VENTOLIN HFA) 108 (90 Base) MCG/ACT inhaler Inhale 2 puffs into the lungs 4 times daily as needed for Wheezing 23  Yes Suki Cohen APRN - CNP   CPAP Machine MISC by Does not apply route CPAP supplies 22  Yes Suki Cohen APRN - CNP   predniSONE (DELTASONE) 50 MG tablet Take 1 tablet by mouth daily  Patient not taking: Reported on 2024    ProviderBeltran MD   amoxicillin (AMOXIL) 500 MG capsule take 1 capsule by mouth three times

## 2024-12-09 NOTE — H&P
weight is 86.6 kg (191 lb). Her bladder temperature is 97.9 °F (36.6 °C). Her blood pressure is 132/82 and her pulse is 79. Her respiration is 20 and oxygen saturation is 98%.   CONSTITUTIONAL:alert & oriented x 3, no acute distress. Calm and pleasant.   SKIN:  Warm and dry, no rashes on exposed areas of skin   HEAD:  Normocephalic, atraumatic   EYES: PERRL.  EOMs intact.  EARS:  Equal bilaterally, no edema or thickening, skin is intact without lumps or lesions. No discharge. Hearing grossly WNL.    NOSE:  Nares patent.  No rhinorrhea   MOUTH/THROAT:  Mucous membranes moist, tongue is pink, uvula midline, teeth appear to be intact  NECK:Full ROM  LUNGS: Respirations even and non-labored. Clear to auscultation bilaterally, no wheezes, rales, or rhonchi.   CARDIOVASCULAR: Regular rate and rhythm, no murmurs/rubs/gallops   ABDOMEN: soft, non-tender, non-distended, bowel sounds active x 4   EXTREMITIES: No edema bilateral lower extremities.  No varicosities bilateral lower extremities.   NEUROLOGIC: CN II-XII are grossly intact.  Gait not assessed.  IMPRESSIONS:   Nodule of upper lobe of right lung     PLANS:   CT NEEDLE BIOPSY LUNG PERCUTANEOUS     BRYANT TRUJILLO CNP   Electronically signed 12/9/2024 at 9:29 AM

## 2024-12-09 NOTE — OR NURSING
Core pecimens obtained by Dr. Smyth, assisted by MARK Austin, handed to pathology staff in room. Total of 7 passes. Dr Moon present.

## 2024-12-09 NOTE — BRIEF OP NOTE
Brief Postoperative Note    Martin Khan  YOB: 1965  [id number]    Pre-operative Diagnosis: Right Lung Nodule      Post-operative Diagnosis: Same    Procedure: Right Lung bx    Medication Given: fentanyl    Anesthesia: 1% Lidocaine     Surgeons/Assistants:  Dr. Smyth and Geovanna Bonds PA-C    Estimated Blood Loss: Minimal    Complications: none    Technically successful bx of right upper lung Nodule.  7 core samples were obtained (2 of the 7 were for culture) and given to the onsite pathologist that confirmed tissue sampling. Dressing applied. Patient tolerated procedure well.    Electronically signed by Geovanna Bonds PA-C on 12/9/2024 at 12:35 PM

## 2024-12-10 LAB
MICROORGANISM SPEC CULT: NORMAL
MICROORGANISM SPEC CULT: NORMAL
MICROORGANISM/AGENT SPEC: NORMAL
MICROORGANISM/AGENT SPEC: NORMAL
SPECIMEN DESCRIPTION: NORMAL
SPECIMEN DESCRIPTION: NORMAL

## 2024-12-12 LAB — SURGICAL PATHOLOGY REPORT: NORMAL

## 2024-12-16 ENCOUNTER — TELEPHONE (OUTPATIENT)
Dept: ONCOLOGY | Age: 59
End: 2024-12-16

## 2024-12-16 NOTE — TELEPHONE ENCOUNTER
Name: Martin Khan  : 1965  MRN: [id number]    Oncology Navigation- Initial Note:  (Unknown)  Intake-  Contact Type: Telephone    Continuum of Care: Diagnosis/Active Treatment    Smoking hx: Never    Notes:   Navigator received assignment and pt. To see Dr. Sanon  follow up Biopsy. Writer will plan to reach out to pt. Post F/U.    Electronically signed by France Rosa RN on 2024 at 9:37 AM

## 2024-12-17 ENCOUNTER — OFFICE VISIT (OUTPATIENT)
Dept: PULMONOLOGY | Age: 59
End: 2024-12-17
Payer: COMMERCIAL

## 2024-12-17 VITALS
WEIGHT: 190.6 LBS | BODY MASS INDEX: 35.98 KG/M2 | RESPIRATION RATE: 20 BRPM | OXYGEN SATURATION: 100 % | HEART RATE: 91 BPM | SYSTOLIC BLOOD PRESSURE: 133 MMHG | DIASTOLIC BLOOD PRESSURE: 81 MMHG | HEIGHT: 61 IN

## 2024-12-17 DIAGNOSIS — E66.811 OBESITY (BMI 30.0-34.9): ICD-10-CM

## 2024-12-17 DIAGNOSIS — G47.33 OSA (OBSTRUCTIVE SLEEP APNEA): ICD-10-CM

## 2024-12-17 DIAGNOSIS — C34.11 MALIGNANT NEOPLASM OF UPPER LOBE OF RIGHT LUNG (HCC): Primary | ICD-10-CM

## 2024-12-17 PROBLEM — J18.9 PNEUMONIA DUE TO INFECTIOUS ORGANISM: Status: ACTIVE | Noted: 2024-12-17

## 2024-12-17 PROCEDURE — 99214 OFFICE O/P EST MOD 30 MIN: CPT | Performed by: INTERNAL MEDICINE

## 2024-12-17 ASSESSMENT — SLEEP AND FATIGUE QUESTIONNAIRES
HOW LIKELY ARE YOU TO NOD OFF OR FALL ASLEEP WHILE SITTING QUIETLY AFTER LUNCH WITHOUT ALCOHOL: WOULD NEVER DOZE
HOW LIKELY ARE YOU TO NOD OFF OR FALL ASLEEP WHILE LYING DOWN TO REST IN THE AFTERNOON WHEN CIRCUMSTANCES PERMIT: WOULD NEVER DOZE
HOW LIKELY ARE YOU TO NOD OFF OR FALL ASLEEP WHEN YOU ARE A PASSENGER IN A CAR FOR AN HOUR WITHOUT A BREAK: WOULD NEVER DOZE
HOW LIKELY ARE YOU TO NOD OFF OR FALL ASLEEP WHILE SITTING AND TALKING TO SOMEONE: WOULD NEVER DOZE
HOW LIKELY ARE YOU TO NOD OFF OR FALL ASLEEP WHILE SITTING AND READING: WOULD NEVER DOZE
HOW LIKELY ARE YOU TO NOD OFF OR FALL ASLEEP IN A CAR, WHILE STOPPED FOR A FEW MINUTES IN TRAFFIC: WOULD NEVER DOZE
HOW LIKELY ARE YOU TO NOD OFF OR FALL ASLEEP WHILE SITTING INACTIVE IN A PUBLIC PLACE: WOULD NEVER DOZE
HOW LIKELY ARE YOU TO NOD OFF OR FALL ASLEEP WHILE WATCHING TV: WOULD NEVER DOZE
ESS TOTAL SCORE: 0

## 2024-12-17 NOTE — PROGRESS NOTES
dysphagia, weakness, numbness, syncope, and tingling  BEHAVIOR/PSYCH:  negative for decreased sleep, decreased energy level, increased energy level, poor concentration, depressed mood, and anxiety  SLEEP: snoring, choking, gasping, periods of not breathing, excessive daytime sleepiness, falling asleep while at work, driving, reading, watching television, talking, disrupted sleep, naps        Physical Exam:    Vitals: /81 (Site: Right Upper Arm, Position: Sitting, Cuff Size: Medium Adult)   Pulse 91   Resp 20   Ht 1.549 m (5' 1\")   Wt 86.5 kg (190 lb 9.6 oz)   LMP 01/08/2016   SpO2 100% Comment: ra  BMI 36.01 kg/m²   Last 3 weights:   Wt Readings from Last 3 Encounters:   12/17/24 86.5 kg (190 lb 9.6 oz)   12/09/24 86.6 kg (191 lb)   11/27/24 86.8 kg (191 lb 6.4 oz)     Body mass index is 36.01 kg/m².    Physical Examination:   General appearance - alert, well appearing, and in no distress and acyanotic, in no respiratory distress  Mental status - alert, oriented to person, place, and time  Eyes - pupils equal and reactive, extraocular eye movements intact, sclera anicteric  Ears - right ear normal, left ear normal  Nose - normal and patent, no erythema, discharge or polyps  Mouth - mucous membranes moist, pharynx normal without lesions and large tongue Mallampati 2  Neck - supple, no significant adenopathy  Chest - no tachypnea, retractions or cyanosis bilateral symmetrical chest movement, normal resonance on percussion, air entry is present bilaterally and symmetrical, no expiratory wheezing rhonchi or crackles.  Heart - normal rate, regular rhythm, normal S1, S2, no murmurs, rubs, clicks or gallops  Abdomen - soft, nontender, nondistended, no masses or organomegaly  Neurological - alert, oriented, normal speech, no focal findings or movement disorder noted}  Extremities - peripheral pulses normal, no pedal edema, no clubbing or cyanosis  Skin - normal coloration and turgor, no rashes, no suspicious

## 2024-12-19 ENCOUNTER — INITIAL CONSULT (OUTPATIENT)
Dept: CARDIOTHORACIC SURGERY | Age: 59
End: 2024-12-19

## 2024-12-19 ENCOUNTER — TELEPHONE (OUTPATIENT)
Dept: ONCOLOGY | Age: 59
End: 2024-12-19

## 2024-12-19 ENCOUNTER — INITIAL CONSULT (OUTPATIENT)
Dept: ONCOLOGY | Age: 59
End: 2024-12-19
Payer: COMMERCIAL

## 2024-12-19 VITALS
TEMPERATURE: 97.3 F | HEART RATE: 80 BPM | SYSTOLIC BLOOD PRESSURE: 130 MMHG | WEIGHT: 187.8 LBS | DIASTOLIC BLOOD PRESSURE: 79 MMHG | BODY MASS INDEX: 35.48 KG/M2 | RESPIRATION RATE: 16 BRPM

## 2024-12-19 VITALS
TEMPERATURE: 97.3 F | OXYGEN SATURATION: 98 % | BODY MASS INDEX: 35.45 KG/M2 | HEIGHT: 61 IN | HEART RATE: 80 BPM | SYSTOLIC BLOOD PRESSURE: 130 MMHG | DIASTOLIC BLOOD PRESSURE: 79 MMHG | WEIGHT: 187.8 LBS

## 2024-12-19 DIAGNOSIS — R94.2 ABNORMAL PET OF RIGHT LUNG: Primary | ICD-10-CM

## 2024-12-19 DIAGNOSIS — C34.11 PRIMARY CANCER OF RIGHT UPPER LOBE OF LUNG (HCC): Primary | ICD-10-CM

## 2024-12-19 PROCEDURE — 99202 OFFICE O/P NEW SF 15 MIN: CPT

## 2024-12-19 PROCEDURE — 99245 OFF/OP CONSLTJ NEW/EST HI 55: CPT | Performed by: INTERNAL MEDICINE

## 2024-12-19 NOTE — TELEPHONE ENCOUNTER
MYNOR HERE FOR CONSULT   Will have lung surgery in January  RV in February  MD VISIT: 2/20/25 @ 1;15PM   AVS PRINTED AND GIVEN ON EXIT

## 2024-12-19 NOTE — PROGRESS NOTES
_           Ms. Martin Khan is a very pleasant 59 y.o. female with history of multiple co morbidities as listed.  The patient is referred for evaluation further management of lung cancer.  The patient had chest infection in 2024.  She was treated with antibiotics as outpatient.  Due to persistence of her symptoms she had chest CT scan 2024.  It showed suspicious mass in the right upper lobe with 2.4 cm is in maximum dimension.  Subsequently patient was seen by pulmonary and she had PET CT scan which confirmed suspicious finding in the right upper lobe with mild increased uptake of 4.1 SUV.  The patient had needle biopsy 2024.  Pathology results showed mucinous adenocarcinoma.  Patient is referred for further management.  She was also seen by chest surgery and plan to have surgical resection in 2025.  Patient denies any chest pain or shortness of breath.  No cough or hemoptysis.  Patient is non-smoker.  No history of alcohol drinking..       PAST MEDICAL HISTORY: has a past medical history of Arthritis, Calcium kidney stone, COVID, Depression, Hematuria, Lung nodule, Obesity (BMI 35.0-39.9 without comorbidity), Pneumonia, and Sleep apnea.    PAST SURGICAL HISTORY: has a past surgical history that includes Tonsillectomy and adenoidectomy; Foot surgery;  section; Colonoscopy (N/A, 2021); and CT NEEDLE BIOPSY LUNG PERCUTANEOUS (2024).     CURRENT MEDICATIONS:  has a current medication list which includes the following prescription(s): ibuprofen, cpap machine, elderberry, magnesium, turmeric, and therapeutic multivitamin-minerals.    ALLERGIES:  has No Known Allergies.    FAMILY HISTORY: Some family member with smoking and lung cancer.  Otherwise negative for any hematological or oncological conditions.     SOCIAL HISTORY:  reports that she has never smoked. She has never used

## 2024-12-19 NOTE — PROGRESS NOTES
Dose modulation, iterative reconstruction, and/or weight based adjustment of  the mA/kV was utilized to reduce the radiation dose to as low as reasonably  achievable.     TECHNIQUE/DESCRIPTION OF PROCEDURE:  Informed consent was obtained after a detailed explanation of the procedure  including risks. Universal protocol was observed.  The patient was placed in  supine position on the CT couch. A  scan was performed through the upper  thorax. A site was selected for skin entry along the anterior aspect of the  right upper lobe.  The skin, underlying soft tissues, and pleura were  anesthetized with 1% lidocaine.  Under intermittent CT guidance, a 19 gauge  guiding needle was advanced through the chest wall through the previously  anesthetized tract into the cavitary nodule of interest in the right upper  lobe.  A 20 gauge biopsy device was inserted coaxially through the guiding  needle into the nodule.  A total of 7 passes were made, each yielding tissue.  Some of the acquired tissue was set aside for culture.  Needle exchanges were  performed during breath holds and as sterile saline was dripped over the  needle hub.  The guide needle was removed.  An occlusive dressing was placed  over the skin entry site.  Postprocedural scan of the chest demonstrated no  significant pneumothorax, pulmonary parenchymal hemorrhage, or hemothorax.  Patient tolerated the procedure well.  Blood loss was minimal.     COMPLICATIONS:  None immediately apparent.     IMPRESSION:  Technically successful CT-guided right upper lobe nodule biopsy, as above.      Assessment   Patient Active Problem List   Diagnosis    Depression    JESSE on CPAP    Obesity (BMI 35.0-39.9 without comorbidity)    Pneumonia due to infectious organism     PLAN:  12/19/24  -Patient completing PFTs today.  -Plan for right upper lobe lobectomy.  Anytime in January.  Office to call and schedule with patient after PFTs are reviewed by Dr. Flowers.  -Patient will need

## 2024-12-26 ENCOUNTER — HOSPITAL ENCOUNTER (OUTPATIENT)
Dept: PULMONOLOGY | Age: 59
Discharge: HOME | End: 2024-12-26
Attending: INTERNAL MEDICINE
Payer: COMMERCIAL

## 2024-12-26 DIAGNOSIS — C34.11 MALIGNANT NEOPLASM OF UPPER LOBE OF RIGHT LUNG (HCC): ICD-10-CM

## 2024-12-26 PROCEDURE — 94060 EVALUATION OF WHEEZING: CPT

## 2024-12-26 PROCEDURE — 94729 DIFFUSING CAPACITY: CPT

## 2024-12-26 PROCEDURE — 94010 BREATHING CAPACITY TEST: CPT

## 2024-12-26 PROCEDURE — 94664 DEMO&/EVAL PT USE INHALER: CPT

## 2024-12-26 PROCEDURE — 94640 AIRWAY INHALATION TREATMENT: CPT

## 2024-12-26 PROCEDURE — 94726 PLETHYSMOGRAPHY LUNG VOLUMES: CPT

## 2025-01-02 PROBLEM — C34.11 PRIMARY CANCER OF RIGHT UPPER LOBE OF LUNG (HCC): Status: ACTIVE | Noted: 2025-01-02

## 2025-01-06 ENCOUNTER — OFFICE VISIT (OUTPATIENT)
Dept: FAMILY MEDICINE CLINIC | Age: 60
End: 2025-01-06
Payer: COMMERCIAL

## 2025-01-06 VITALS
SYSTOLIC BLOOD PRESSURE: 120 MMHG | HEART RATE: 85 BPM | BODY MASS INDEX: 34.58 KG/M2 | WEIGHT: 183 LBS | DIASTOLIC BLOOD PRESSURE: 80 MMHG | OXYGEN SATURATION: 98 % | TEMPERATURE: 96.9 F

## 2025-01-06 DIAGNOSIS — J10.1 INFLUENZA B: ICD-10-CM

## 2025-01-06 DIAGNOSIS — Z00.00 ENCOUNTER FOR WELL ADULT EXAM WITHOUT ABNORMAL FINDINGS: Primary | ICD-10-CM

## 2025-01-06 LAB
INFLUENZA A ANTIBODY: NORMAL
INFLUENZA B ANTIBODY: POSITIVE
MICROORGANISM SPEC CULT: NORMAL
MICROORGANISM SPEC CULT: NORMAL
MICROORGANISM/AGENT SPEC: NORMAL
MICROORGANISM/AGENT SPEC: NORMAL
SPECIMEN DESCRIPTION: NORMAL
SPECIMEN DESCRIPTION: NORMAL

## 2025-01-06 PROCEDURE — 99213 OFFICE O/P EST LOW 20 MIN: CPT | Performed by: NURSE PRACTITIONER

## 2025-01-06 PROCEDURE — 99396 PREV VISIT EST AGE 40-64: CPT | Performed by: NURSE PRACTITIONER

## 2025-01-06 PROCEDURE — 87804 INFLUENZA ASSAY W/OPTIC: CPT | Performed by: NURSE PRACTITIONER

## 2025-01-06 RX ORDER — DOXYCYCLINE HYCLATE 100 MG
100 TABLET ORAL 2 TIMES DAILY
Qty: 14 TABLET | Refills: 0 | Status: SHIPPED | OUTPATIENT
Start: 2025-01-06 | End: 2025-01-06 | Stop reason: CLARIF

## 2025-01-06 SDOH — ECONOMIC STABILITY: FOOD INSECURITY: WITHIN THE PAST 12 MONTHS, THE FOOD YOU BOUGHT JUST DIDN'T LAST AND YOU DIDN'T HAVE MONEY TO GET MORE.: NEVER TRUE

## 2025-01-06 SDOH — ECONOMIC STABILITY: FOOD INSECURITY: WITHIN THE PAST 12 MONTHS, YOU WORRIED THAT YOUR FOOD WOULD RUN OUT BEFORE YOU GOT MONEY TO BUY MORE.: NEVER TRUE

## 2025-01-06 SDOH — ECONOMIC STABILITY: INCOME INSECURITY: HOW HARD IS IT FOR YOU TO PAY FOR THE VERY BASICS LIKE FOOD, HOUSING, MEDICAL CARE, AND HEATING?: NOT HARD AT ALL

## 2025-01-06 ASSESSMENT — PATIENT HEALTH QUESTIONNAIRE - PHQ9
2. FEELING DOWN, DEPRESSED OR HOPELESS: NOT AT ALL
4. FEELING TIRED OR HAVING LITTLE ENERGY: NOT AT ALL
8. MOVING OR SPEAKING SO SLOWLY THAT OTHER PEOPLE COULD HAVE NOTICED. OR THE OPPOSITE, BEING SO FIGETY OR RESTLESS THAT YOU HAVE BEEN MOVING AROUND A LOT MORE THAN USUAL: NOT AT ALL
1. LITTLE INTEREST OR PLEASURE IN DOING THINGS: NOT AT ALL
3. TROUBLE FALLING OR STAYING ASLEEP: NOT AT ALL
SUM OF ALL RESPONSES TO PHQ QUESTIONS 1-9: 0
7. TROUBLE CONCENTRATING ON THINGS, SUCH AS READING THE NEWSPAPER OR WATCHING TELEVISION: NOT AT ALL
5. POOR APPETITE OR OVEREATING: NOT AT ALL
SUM OF ALL RESPONSES TO PHQ9 QUESTIONS 1 & 2: 0
9. THOUGHTS THAT YOU WOULD BE BETTER OFF DEAD, OR OF HURTING YOURSELF: NOT AT ALL
SUM OF ALL RESPONSES TO PHQ QUESTIONS 1-9: 0
6. FEELING BAD ABOUT YOURSELF - OR THAT YOU ARE A FAILURE OR HAVE LET YOURSELF OR YOUR FAMILY DOWN: NOT AT ALL
SUM OF ALL RESPONSES TO PHQ QUESTIONS 1-9: 0
SUM OF ALL RESPONSES TO PHQ QUESTIONS 1-9: 0
10. IF YOU CHECKED OFF ANY PROBLEMS, HOW DIFFICULT HAVE THESE PROBLEMS MADE IT FOR YOU TO DO YOUR WORK, TAKE CARE OF THINGS AT HOME, OR GET ALONG WITH OTHER PEOPLE: NOT DIFFICULT AT ALL

## 2025-01-06 ASSESSMENT — ENCOUNTER SYMPTOMS
COUGH: 1
SINUS PRESSURE: 1
SORE THROAT: 1

## 2025-01-06 NOTE — PROGRESS NOTES
Well Adult Note  Name: Martin Khan Today’s Date: 2025   MRN: [id number] Sex: Female   Age: 59 y.o. Ethnicity: Non- / Non    : 1965 Race: Black /       Martin Khan is here for a well adult exam.          Assessment & Plan  1. Influenza.  She tested positive for influenza. She has been advised against picking up doxycycline. Laboratory tests will be conducted once her condition improves. She has been recommended to rest, maintain hydration, and use over-the-counter cough and cold medications such as Delsym or Robitussin-DM.    2. Early-stage carcinoma.  A small carcinoma was identified in the early stages. Surgical removal has been recommended by her specialist. No chemotherapy is required at this stage. She will be in intensive care for a week post-surgery as a standard procedure. She has been advised to take time off work for recovery.    3. Health maintenance.  She is due for her yearly blood work. Fasting labs will be ordered to check cholesterol, kidney function, and other parameters.    Encounter for well adult exam without abnormal findings  -     CBC with Auto Differential; Future  -     Comprehensive Metabolic Panel; Future  -     TSH reflex to FT4; Future  -     Lipid, Fasting; Future  -     Hemoglobin A1C; Future  Influenza B  -     POCT Influenza A/B    Results  Testing  Biopsy revealed a small carcinoma in the beginning stages.       Return in about 1 year (around 2026) for annual physical.     Subjective   History of Present Illness  The patient is a 59-year-old female who presents for an annual physical.    She has been diagnosed with early-stage carcinoma, which was discovered during a biopsy.The initial suspicion was pneumonia, prompting further investigation. She is currently awaiting the results of her pulmonary function test. A consultation with Dr. Quiroz, an oncologist, has been conducted. She has been informed that she will require a week-long

## 2025-01-06 NOTE — PATIENT INSTRUCTIONS
hands, brush your teeth twice a day, and wear a seat belt in the car.   Where can you learn more?  Go to https://www.WebTeb.net/patientEd and enter P072 to learn more about \"Well Visit, Ages 18 to 65: Care Instructions.\"  Current as of: August 6, 2023  Content Version: 14.2  © 2024 [employer].   Care instructions adapted under license by [employer]. If you have questions about a medical condition or this instruction, always ask your healthcare professional. [employer] disclaims any warranty or liability for your use of this information.

## 2025-01-13 ENCOUNTER — HOSPITAL ENCOUNTER (OUTPATIENT)
Age: 60
Setting detail: SPECIMEN
Discharge: HOME | End: 2025-01-13

## 2025-01-13 DIAGNOSIS — Z00.00 ENCOUNTER FOR WELL ADULT EXAM WITHOUT ABNORMAL FINDINGS: ICD-10-CM

## 2025-01-13 LAB
ALBUMIN SERPL-MCNC: 4.2 G/DL (ref 3.5–5.2)
ALBUMIN/GLOB SERPL: 1.5 {RATIO} (ref 1–2.5)
ALP SERPL-CCNC: 66 U/L (ref 35–104)
ALT SERPL-CCNC: 13 U/L (ref 10–35)
ANION GAP SERPL CALCULATED.3IONS-SCNC: 10 MMOL/L (ref 9–16)
AST SERPL-CCNC: 16 U/L (ref 10–35)
BASOPHILS # BLD: <0.03 K/UL (ref 0–0.2)
BASOPHILS NFR BLD: 1 % (ref 0–2)
BILIRUB SERPL-MCNC: 0.4 MG/DL (ref 0–1.2)
BUN SERPL-MCNC: 14 MG/DL (ref 8–23)
CALCIUM SERPL-MCNC: 9.3 MG/DL (ref 8.6–10.4)
CHLORIDE SERPL-SCNC: 106 MMOL/L (ref 98–107)
CHOLEST SERPL-MCNC: 200 MG/DL (ref 0–199)
CHOLESTEROL/HDL RATIO: 2.5
CO2 SERPL-SCNC: 26 MMOL/L (ref 20–31)
CREAT SERPL-MCNC: 0.7 MG/DL (ref 0.6–0.9)
EOSINOPHIL # BLD: 0.14 K/UL (ref 0–0.44)
EOSINOPHILS RELATIVE PERCENT: 3 % (ref 1–4)
ERYTHROCYTE [DISTWIDTH] IN BLOOD BY AUTOMATED COUNT: 12.7 % (ref 11.8–14.4)
EST. AVERAGE GLUCOSE BLD GHB EST-MCNC: 117 MG/DL
GFR, ESTIMATED: >90 ML/MIN/1.73M2
GLUCOSE SERPL-MCNC: 89 MG/DL (ref 74–99)
HBA1C MFR BLD: 5.7 % (ref 4–6)
HCT VFR BLD AUTO: 40.6 % (ref 36.3–47.1)
HDLC SERPL-MCNC: 81 MG/DL
HGB BLD-MCNC: 12.8 G/DL (ref 11.9–15.1)
IMM GRANULOCYTES # BLD AUTO: <0.03 K/UL (ref 0–0.3)
IMM GRANULOCYTES NFR BLD: 0 %
LDLC SERPL CALC-MCNC: 97 MG/DL (ref 0–100)
LYMPHOCYTES NFR BLD: 2.16 K/UL (ref 1.1–3.7)
LYMPHOCYTES RELATIVE PERCENT: 52 % (ref 24–43)
MCH RBC QN AUTO: 27.1 PG (ref 25.2–33.5)
MCHC RBC AUTO-ENTMCNC: 31.5 G/DL (ref 28.4–34.8)
MCV RBC AUTO: 86 FL (ref 82.6–102.9)
MICROORGANISM SPEC CULT: NORMAL
MICROORGANISM/AGENT SPEC: NORMAL
MONOCYTES NFR BLD: 0.39 K/UL (ref 0.1–1.2)
MONOCYTES NFR BLD: 10 % (ref 3–12)
NEUTROPHILS NFR BLD: 34 % (ref 36–65)
NEUTS SEG NFR BLD: 1.41 K/UL (ref 1.5–8.1)
NRBC BLD-RTO: 0 PER 100 WBC
PLATELET # BLD AUTO: 267 K/UL (ref 138–453)
PMV BLD AUTO: 11 FL (ref 8.1–13.5)
POTASSIUM SERPL-SCNC: 4 MMOL/L (ref 3.7–5.3)
PROT SERPL-MCNC: 7 G/DL (ref 6.6–8.7)
RBC # BLD AUTO: 4.72 M/UL (ref 3.95–5.11)
SODIUM SERPL-SCNC: 142 MMOL/L (ref 136–145)
SPECIMEN DESCRIPTION: NORMAL
TRIGL SERPL-MCNC: 109 MG/DL (ref 0–149)
TSH SERPL DL<=0.05 MIU/L-ACNC: 0.3 UIU/ML (ref 0.27–4.2)
VLDLC SERPL CALC-MCNC: 22 MG/DL (ref 1–30)
WBC OTHER # BLD: 4.1 K/UL (ref 3.5–11.3)

## 2025-01-20 LAB
MICROORGANISM SPEC CULT: NORMAL
MICROORGANISM/AGENT SPEC: NORMAL
SPECIMEN DESCRIPTION: NORMAL

## 2025-01-22 ENCOUNTER — TELEPHONE (OUTPATIENT)
Dept: FAMILY MEDICINE CLINIC | Age: 60
End: 2025-01-22

## 2025-01-27 LAB
MICROORGANISM SPEC CULT: NORMAL
MICROORGANISM/AGENT SPEC: NORMAL
SPECIMEN DESCRIPTION: NORMAL

## 2025-02-03 ENCOUNTER — OFFICE VISIT (OUTPATIENT)
Dept: FAMILY MEDICINE CLINIC | Age: 60
End: 2025-02-03
Payer: COMMERCIAL

## 2025-02-03 ENCOUNTER — TELEPHONE (OUTPATIENT)
Dept: VASCULAR SURGERY | Age: 60
End: 2025-02-03

## 2025-02-03 VITALS
WEIGHT: 187 LBS | HEART RATE: 84 BPM | DIASTOLIC BLOOD PRESSURE: 80 MMHG | TEMPERATURE: 97.6 F | SYSTOLIC BLOOD PRESSURE: 118 MMHG | OXYGEN SATURATION: 98 % | BODY MASS INDEX: 35.33 KG/M2

## 2025-02-03 DIAGNOSIS — C34.11 PRIMARY CANCER OF RIGHT UPPER LOBE OF LUNG (HCC): Primary | ICD-10-CM

## 2025-02-03 PROCEDURE — 99213 OFFICE O/P EST LOW 20 MIN: CPT | Performed by: NURSE PRACTITIONER

## 2025-02-03 SDOH — ECONOMIC STABILITY: FOOD INSECURITY: WITHIN THE PAST 12 MONTHS, THE FOOD YOU BOUGHT JUST DIDN'T LAST AND YOU DIDN'T HAVE MONEY TO GET MORE.: NEVER TRUE

## 2025-02-03 SDOH — ECONOMIC STABILITY: FOOD INSECURITY: WITHIN THE PAST 12 MONTHS, YOU WORRIED THAT YOUR FOOD WOULD RUN OUT BEFORE YOU GOT MONEY TO BUY MORE.: NEVER TRUE

## 2025-02-03 ASSESSMENT — PATIENT HEALTH QUESTIONNAIRE - PHQ9
SUM OF ALL RESPONSES TO PHQ QUESTIONS 1-9: 0
9. THOUGHTS THAT YOU WOULD BE BETTER OFF DEAD, OR OF HURTING YOURSELF: NOT AT ALL
2. FEELING DOWN, DEPRESSED OR HOPELESS: NOT AT ALL
8. MOVING OR SPEAKING SO SLOWLY THAT OTHER PEOPLE COULD HAVE NOTICED. OR THE OPPOSITE, BEING SO FIGETY OR RESTLESS THAT YOU HAVE BEEN MOVING AROUND A LOT MORE THAN USUAL: NOT AT ALL
10. IF YOU CHECKED OFF ANY PROBLEMS, HOW DIFFICULT HAVE THESE PROBLEMS MADE IT FOR YOU TO DO YOUR WORK, TAKE CARE OF THINGS AT HOME, OR GET ALONG WITH OTHER PEOPLE: NOT DIFFICULT AT ALL
SUM OF ALL RESPONSES TO PHQ QUESTIONS 1-9: 0
3. TROUBLE FALLING OR STAYING ASLEEP: NOT AT ALL
7. TROUBLE CONCENTRATING ON THINGS, SUCH AS READING THE NEWSPAPER OR WATCHING TELEVISION: NOT AT ALL
1. LITTLE INTEREST OR PLEASURE IN DOING THINGS: NOT AT ALL
5. POOR APPETITE OR OVEREATING: NOT AT ALL
6. FEELING BAD ABOUT YOURSELF - OR THAT YOU ARE A FAILURE OR HAVE LET YOURSELF OR YOUR FAMILY DOWN: NOT AT ALL
SUM OF ALL RESPONSES TO PHQ9 QUESTIONS 1 & 2: 0
SUM OF ALL RESPONSES TO PHQ QUESTIONS 1-9: 0
SUM OF ALL RESPONSES TO PHQ QUESTIONS 1-9: 0
4. FEELING TIRED OR HAVING LITTLE ENERGY: NOT AT ALL

## 2025-02-03 NOTE — PROGRESS NOTES
Suki Cohen, [facility]  [address]  [place]  [address]  [phone]  [phone]    Martin Khan is a 59 y.o. female who is here with c/o of:    Chief Complaint: Other (Routine check per pt)      Patient Accompanied by: n/a    HPI - Martin Khan is here today with c/o:    History of Present Illness  The patient presents for evaluation of a lung nodule.    She has been referred to our facility by Dr. Sanon for the completion of [place] paperwork, which he is unable to do. She has also sought consultation from Dr. Miles regarding surgical intervention for a lung nodule. However, she is interested in exploring non-invasive treatment options, as advised by Dr. Morales, who does not believe the condition warrants surgery. Her last appointment was for a pulmonary function test last month, but she has not received any follow-up communication. Despite her efforts to schedule another appointment with Dr. Carter, she has been unsuccessful. She has previously consulted with Dr. Miles, who recommended surgery, and Dr. Morales, who suggested that the condition was not severe enough to necessitate surgical intervention. She is currently uncertain about the appropriate course of action. She has expressed interest in laser treatment for the lung nodule. She has been informed that her blood work results are within normal limits.       Health Maintenance Due   Topic Date Due    Cervical cancer screen  08/27/2021        Patient Active Problem List:     Depression     JESSE on CPAP     Obesity (BMI 35.0-39.9 without comorbidity)     Pneumonia due to infectious organism     Primary cancer of right upper lobe of lung (HCC)     Past Medical History:   Diagnosis Date    Arthritis     Calcium kidney stone     history of.    COVID     Twice in 2023    Depression     Hematuria     Lung nodule     Obesity (BMI 35.0-39.9 without comorbidity) 03/30/2016    Pneumonia     2023    Sleep apnea     +

## 2025-02-03 NOTE — TELEPHONE ENCOUNTER
Patient called to schedule surgery.  Please let me know what surgery.  Note also states that Dr Flowers needs to go over PFT's, she did have those done.  Thanks

## 2025-02-10 ENCOUNTER — OFFICE VISIT (OUTPATIENT)
Dept: PULMONOLOGY | Age: 60
End: 2025-02-10
Payer: COMMERCIAL

## 2025-02-10 VITALS
BODY MASS INDEX: 35.12 KG/M2 | WEIGHT: 186 LBS | HEART RATE: 88 BPM | SYSTOLIC BLOOD PRESSURE: 129 MMHG | RESPIRATION RATE: 12 BRPM | HEIGHT: 61 IN | OXYGEN SATURATION: 100 % | DIASTOLIC BLOOD PRESSURE: 89 MMHG

## 2025-02-10 DIAGNOSIS — G47.33 OSA (OBSTRUCTIVE SLEEP APNEA): ICD-10-CM

## 2025-02-10 DIAGNOSIS — R91.1 NODULE OF UPPER LOBE OF RIGHT LUNG: ICD-10-CM

## 2025-02-10 DIAGNOSIS — C34.11 MALIGNANT NEOPLASM OF UPPER LOBE OF RIGHT LUNG (HCC): Primary | ICD-10-CM

## 2025-02-10 DIAGNOSIS — E66.811 OBESITY (BMI 30.0-34.9): ICD-10-CM

## 2025-02-10 PROCEDURE — 99214 OFFICE O/P EST MOD 30 MIN: CPT | Performed by: INTERNAL MEDICINE

## 2025-02-10 NOTE — PROGRESS NOTES
gait problems, tremor, dysphagia, weakness, numbness, syncope, and tingling  BEHAVIOR/PSYCH:  negative for decreased sleep, decreased energy level, increased energy level, poor concentration, depressed mood, and anxiety          Physical Exam:    Vitals: /89 (Site: Left Upper Arm)   Pulse 88   Resp 12   Ht 1.549 m (5' 1\")   Wt 84.4 kg (186 lb)   LMP 01/08/2016   SpO2 100% Comment: room air at rest  BMI 35.14 kg/m²   Last 3 weights:   Wt Readings from Last 3 Encounters:   02/10/25 84.4 kg (186 lb)   02/03/25 84.8 kg (187 lb)   01/06/25 83 kg (183 lb)     Body mass index is 35.14 kg/m².    Physical Exam        Physical Examination:   General appearance - alert, well appearing, and in no distress and acyanotic, in no respiratory distress  Mental status - alert, oriented to person, place, and time  Eyes - pupils equal and reactive, extraocular eye movements intact, sclera anicteric  Ears - right ear normal, left ear normal  Nose - normal and patent, no erythema, discharge or polyps  Mouth - mucous membranes moist, pharynx normal without lesions and large tongue Mallampati 2  Neck - supple, no significant adenopathy  Chest - no tachypnea, retractions or cyanosis bilateral symmetrical chest movement, normal resonance on percussion, air entry is present bilaterally and symmetrical, no expiratory wheezing rhonchi or crackles.  Heart - normal rate, regular rhythm, normal S1, S2, no murmurs, rubs, clicks or gallops  Abdomen - soft, nontender, nondistended, no masses or organomegaly  Neurological - alert, oriented, normal speech, no focal findings or movement disorder noted  Extremities - peripheral pulses normal, no pedal edema, no clubbing or cyanosis  Skin - normal coloration and turgor, no rashes, no suspicious skin lesions noted       LABS:    CBC:   WBC   Date Value Ref Range Status   01/13/2025 4.1 3.5 - 11.3 k/uL Final   07/15/2024 4.6 3.5 - 11.3 k/uL Final   02/26/2024 4.3 3.5 - 11.3 k/uL Final

## 2025-02-10 NOTE — PATIENT INSTRUCTIONS
Called Dr Flowers office. Office staff stated that the dr hasn't reviewed patients PFT yet and staff is unaware of what procedure needs to be done. Writer informed by staff that once their office gets a response they will contact patient with more information. Writer will follow to make sure this is resolved. [place]

## 2025-02-12 ENCOUNTER — PREP FOR PROCEDURE (OUTPATIENT)
Dept: VASCULAR SURGERY | Age: 60
End: 2025-02-12

## 2025-02-12 DIAGNOSIS — R91.1 NODULE OF RIGHT LUNG: ICD-10-CM

## 2025-02-25 RX ORDER — SODIUM CHLORIDE, SODIUM LACTATE, POTASSIUM CHLORIDE, CALCIUM CHLORIDE 600; 310; 30; 20 MG/100ML; MG/100ML; MG/100ML; MG/100ML
INJECTION, SOLUTION INTRAVENOUS CONTINUOUS
OUTPATIENT
Start: 2025-02-25

## 2025-02-25 NOTE — DISCHARGE INSTRUCTIONS
surgery.  You also may bring your hearing aids.      Bring your blood band if one has been given to you.  Please do not close the clasp.    If you are on C-PAP or Bi-PAP at home and plan on staying in the hospital overnight for your surgery please bring the machine with you.      Do not wear any jewelry or body piercings day of surgery.  Also, NO lotion, perfume or deodorant to be used the day of surgery.    Do not bring any valuables, such as jewelry, cash or credit cards.  If you are staying overnight with us, please bring a SMALL bag of personal items.  We cannot accommodate large items, like suitcases.      Please wear loose, comfortable clothing.  If you are potentially going to have a cast or brace bring clothing that will fit over them.                                                                                                                          In case of illness - If you have cold or flu like symptoms (high fever, runny nose, sore throat, cough, etc.) rash, nausea, vomiting, loose stools, and/or recent contact with someone who has a contagious disease (chicken pox, measles, etc.) Please call your doctor before coming to the hospital.      If your child is having surgery please make arrangements for any other children to be cared for at home on the day of surgery.  Other children are not permitted in recovery room and we want you to be able to spend time with the patient.  If other arrangements are not available then we suggest that you have a second adult to stay in the waiting room.      If you have any other questions regarding your procedure or the day of surgery, please call [phone], or [phone]

## 2025-03-03 ENCOUNTER — HOSPITAL ENCOUNTER (OUTPATIENT)
Dept: PREADMISSION TESTING | Age: 60
Discharge: HOME | End: 2025-03-07
Payer: COMMERCIAL

## 2025-03-03 ENCOUNTER — HOSPITAL ENCOUNTER (OUTPATIENT)
Dept: GENERAL RADIOLOGY | Age: 60
Discharge: HOME | End: 2025-03-05
Payer: COMMERCIAL

## 2025-03-03 VITALS
HEIGHT: 61 IN | WEIGHT: 183 LBS | DIASTOLIC BLOOD PRESSURE: 90 MMHG | HEART RATE: 64 BPM | RESPIRATION RATE: 18 BRPM | BODY MASS INDEX: 34.55 KG/M2 | OXYGEN SATURATION: 100 % | TEMPERATURE: 97.7 F | SYSTOLIC BLOOD PRESSURE: 134 MMHG

## 2025-03-03 LAB
ABO + RH BLD: NORMAL
ALLEN TEST: POSITIVE
ANION GAP SERPL CALCULATED.3IONS-SCNC: 11 MMOL/L (ref 9–16)
ARM BAND NUMBER: NORMAL
BASOPHILS # BLD: <0.03 K/UL (ref 0–0.2)
BASOPHILS NFR BLD: 1 % (ref 0–2)
BILIRUB UR QL STRIP: NEGATIVE
BLOOD BANK SAMPLE EXPIRATION: NORMAL
BLOOD GROUP ANTIBODIES SERPL: NEGATIVE
BUN SERPL-MCNC: 10 MG/DL (ref 8–23)
CALCIUM SERPL-MCNC: 9.1 MG/DL (ref 8.6–10.4)
CHLORIDE SERPL-SCNC: 107 MMOL/L (ref 98–107)
CLARITY UR: CLEAR
CO2 SERPL-SCNC: 23 MMOL/L (ref 20–31)
COLOR UR: YELLOW
COMMENT: NORMAL
CREAT SERPL-MCNC: 0.7 MG/DL (ref 0.6–0.9)
EOSINOPHIL # BLD: 0.08 K/UL (ref 0–0.44)
EOSINOPHILS RELATIVE PERCENT: 3 % (ref 1–4)
ERYTHROCYTE [DISTWIDTH] IN BLOOD BY AUTOMATED COUNT: 13.2 % (ref 11.8–14.4)
FIO2: 21
GFR, ESTIMATED: >90 ML/MIN/1.73M2
GLUCOSE SERPL-MCNC: 122 MG/DL (ref 74–99)
GLUCOSE UR STRIP-MCNC: NEGATIVE MG/DL
HCT VFR BLD AUTO: 41.3 % (ref 36.3–47.1)
HGB BLD-MCNC: 12.8 G/DL (ref 11.9–15.1)
HGB UR QL STRIP.AUTO: NEGATIVE
IMM GRANULOCYTES # BLD AUTO: <0.03 K/UL (ref 0–0.3)
IMM GRANULOCYTES NFR BLD: 0 %
INR PPP: 0.9
KETONES UR STRIP-MCNC: NEGATIVE MG/DL
LEUKOCYTE ESTERASE UR QL STRIP: NEGATIVE
LYMPHOCYTES NFR BLD: 1.17 K/UL (ref 1.1–3.7)
LYMPHOCYTES RELATIVE PERCENT: 39 % (ref 24–43)
MCH RBC QN AUTO: 26.9 PG (ref 25.2–33.5)
MCHC RBC AUTO-ENTMCNC: 31 G/DL (ref 28.4–34.8)
MCV RBC AUTO: 86.8 FL (ref 82.6–102.9)
MONOCYTES NFR BLD: 0.3 K/UL (ref 0.1–1.2)
MONOCYTES NFR BLD: 10 % (ref 3–12)
NEGATIVE BASE EXCESS, ART: 1.1 MMOL/L (ref 0–2)
NEUTROPHILS NFR BLD: 47 % (ref 36–65)
NEUTS SEG NFR BLD: 1.42 K/UL (ref 1.5–8.1)
NITRITE UR QL STRIP: NEGATIVE
NRBC BLD-RTO: 0 PER 100 WBC
O2 DELIVERY DEVICE: NORMAL
PARTIAL THROMBOPLASTIN TIME: 28.2 SEC (ref 23–36.5)
PH UR STRIP: 5.5 [PH] (ref 5–8)
PLATELET # BLD AUTO: 249 K/UL (ref 138–453)
PMV BLD AUTO: 10.5 FL (ref 8.1–13.5)
POC HCO3: 23.6 MMOL/L (ref 21–28)
POC O2 SATURATION: 97.6 % (ref 94–98)
POC PCO2: 38.7 MM HG (ref 35–48)
POC PH: 7.39 (ref 7.35–7.45)
POC PO2: 98.8 MM HG (ref 83–108)
POTASSIUM SERPL-SCNC: 4.3 MMOL/L (ref 3.7–5.3)
PROT UR STRIP-MCNC: NEGATIVE MG/DL
PROTHROMBIN TIME: 12.3 SEC (ref 11.7–14.9)
RBC # BLD AUTO: 4.76 M/UL (ref 3.95–5.11)
SAMPLE SITE: NORMAL
SODIUM SERPL-SCNC: 141 MMOL/L (ref 136–145)
SP GR UR STRIP: 1.02 (ref 1–1.03)
UROBILINOGEN UR STRIP-ACNC: NORMAL EU/DL (ref 0–1)
WBC OTHER # BLD: 3 K/UL (ref 3.5–11.3)

## 2025-03-03 PROCEDURE — 86901 BLOOD TYPING SEROLOGIC RH(D): CPT

## 2025-03-03 PROCEDURE — 85025 COMPLETE CBC W/AUTO DIFF WBC: CPT

## 2025-03-03 PROCEDURE — 36600 WITHDRAWAL OF ARTERIAL BLOOD: CPT

## 2025-03-03 PROCEDURE — 87086 URINE CULTURE/COLONY COUNT: CPT

## 2025-03-03 PROCEDURE — 87641 MR-STAPH DNA AMP PROBE: CPT

## 2025-03-03 PROCEDURE — 81003 URINALYSIS AUTO W/O SCOPE: CPT

## 2025-03-03 PROCEDURE — 71046 X-RAY EXAM CHEST 2 VIEWS: CPT

## 2025-03-03 PROCEDURE — 85730 THROMBOPLASTIN TIME PARTIAL: CPT

## 2025-03-03 PROCEDURE — 85610 PROTHROMBIN TIME: CPT

## 2025-03-03 PROCEDURE — 86850 RBC ANTIBODY SCREEN: CPT

## 2025-03-03 PROCEDURE — 82803 BLOOD GASES ANY COMBINATION: CPT

## 2025-03-03 PROCEDURE — 80048 BASIC METABOLIC PNL TOTAL CA: CPT

## 2025-03-03 PROCEDURE — 86900 BLOOD TYPING SEROLOGIC ABO: CPT

## 2025-03-03 PROCEDURE — 93005 ELECTROCARDIOGRAM TRACING: CPT | Performed by: THORACIC SURGERY (CARDIOTHORACIC VASCULAR SURGERY)

## 2025-03-03 PROCEDURE — 36415 COLL VENOUS BLD VENIPUNCTURE: CPT

## 2025-03-04 LAB
MICROORGANISM SPEC CULT: NORMAL
MRSA, DNA, NASAL: NEGATIVE
SPECIMEN DESCRIPTION: NORMAL
SPECIMEN DESCRIPTION: NORMAL

## 2025-03-05 LAB
EKG ATRIAL RATE: 61 BPM
EKG P AXIS: 50 DEGREES
EKG P-R INTERVAL: 158 MS
EKG Q-T INTERVAL: 402 MS
EKG QRS DURATION: 70 MS
EKG QTC CALCULATION (BAZETT): 404 MS
EKG R AXIS: -17 DEGREES
EKG T AXIS: 21 DEGREES
EKG VENTRICULAR RATE: 61 BPM

## 2025-03-20 DIAGNOSIS — Z01.818 PREOP EXAMINATION: Primary | ICD-10-CM

## 2025-03-21 LAB
ABO + RH BLD: NORMAL
ARM BAND NUMBER: NORMAL
BLOOD BANK SAMPLE EXPIRATION: NORMAL
BLOOD GROUP ANTIBODIES SERPL: NEGATIVE

## 2025-04-08 ENCOUNTER — TELEPHONE (OUTPATIENT)
Age: 60
End: 2025-04-08

## 2025-04-17 ENCOUNTER — OFFICE VISIT (OUTPATIENT)
Age: 60
End: 2025-04-17
Payer: COMMERCIAL

## 2025-04-17 VITALS
OXYGEN SATURATION: 100 % | HEART RATE: 69 BPM | WEIGHT: 196 LBS | BODY MASS INDEX: 37.03 KG/M2 | DIASTOLIC BLOOD PRESSURE: 78 MMHG | SYSTOLIC BLOOD PRESSURE: 137 MMHG

## 2025-04-17 DIAGNOSIS — Z01.818 PRE-OPERATIVE CLEARANCE: Primary | ICD-10-CM

## 2025-04-17 DIAGNOSIS — E66.9 OBESITY (BMI 35.0-39.9 WITHOUT COMORBIDITY): ICD-10-CM

## 2025-04-17 DIAGNOSIS — R06.02 SOB (SHORTNESS OF BREATH): ICD-10-CM

## 2025-04-17 DIAGNOSIS — C34.11 PRIMARY CANCER OF RIGHT UPPER LOBE OF LUNG (HCC): ICD-10-CM

## 2025-04-17 DIAGNOSIS — G47.33 OSA ON CPAP: ICD-10-CM

## 2025-04-17 PROCEDURE — 99205 OFFICE O/P NEW HI 60 MIN: CPT | Performed by: INTERNAL MEDICINE

## 2025-04-17 PROCEDURE — 93000 ELECTROCARDIOGRAM COMPLETE: CPT | Performed by: INTERNAL MEDICINE

## 2025-04-17 NOTE — PROGRESS NOTES
Cardiology Office Consultation           CC: Patient is here to establish cardiac care for perioperative care restratification    HPI:  This is a 59-year-old female with history of nephrolithiasis, generalized osteoarthritis, obesity with obstructive sleep apnea and depression who was diagnosed with a lung nodule that is scheduled for resection however preoperative EKG reading was abnormal and anesthesia canceled her procedure.    Patient denies any history of myocardial infarction or CHF.  She denies any chest pain, dyspnea, orthopnea, PND, palpitation or dizziness.  EKG shows sinus rhythm with poor R wave progression and it has been unchanged for the past few years.  Echocardiogram in  was unremarkable.    She denies any nicotine, alcohol or drug abuse.    Past Medical:  Past Medical History:   Diagnosis Date    Arthritis     left foot    Calcium kidney stone     history of.    COVID     Twice in     Depression     Hematuria     Lung nodule     Dr. Sanon Pulmonology [facility]    Obesity (BMI 35.0-39.9 without comorbidity) 2016    Plantar fasciitis, right     Pneumonia         PONV (postoperative nausea and vomiting)     Sleep apnea     + Cpap mask    Under care of team     Suki Cohen CNP PCP [facility] [address] last appt 2025       Past Surgical:  Past Surgical History:   Procedure Laterality Date     SECTION      x3    COLONOSCOPY N/A 2021    COLONOSCOPY POLYPECTOMY COLD BIOPSY performed by Tatianna Hartman MD at [facility] ENDO    CT NEEDLE BIOPSY LUNG PERCUTANEOUS W IMAGING GUIDANCE  2024    CT NEEDLE BIOPSY LUNG PERCUTANEOUS 2024 [facility] CT SCAN    FOOT SURGERY      left foot has a pin    TONSILLECTOMY AND ADENOIDECTOMY         Family History:  Family History   Problem Relation Age of Onset    Kidney Disease Mother     High Blood Pressure Mother     Other Father         dementia    High Blood Pressure Father     Sleep Apnea Sister     High Blood Pressure Sister     Other

## 2025-04-22 ENCOUNTER — HOSPITAL ENCOUNTER (OUTPATIENT)
Age: 60
Discharge: HOME | End: 2025-04-24
Attending: INTERNAL MEDICINE
Payer: COMMERCIAL

## 2025-04-22 DIAGNOSIS — Z01.818 PRE-OPERATIVE CLEARANCE: ICD-10-CM

## 2025-04-22 DIAGNOSIS — R06.02 SOB (SHORTNESS OF BREATH): ICD-10-CM

## 2025-04-22 LAB
ECHO AO ASC DIAM: 3.1 CM
ECHO AO ROOT DIAM: 2.9 CM
ECHO AV AREA PEAK VELOCITY: 2 CM2
ECHO AV AREA VTI: 1.9 CM2
ECHO AV MEAN GRADIENT: 3 MMHG
ECHO AV MEAN VELOCITY: 0.8 M/S
ECHO AV PEAK GRADIENT: 5 MMHG
ECHO AV PEAK VELOCITY: 1.1 M/S
ECHO AV VELOCITY RATIO: 0.82
ECHO AV VTI: 27.5 CM
ECHO LA AREA 2C: 14.8 CM2
ECHO LA AREA 4C: 15.1 CM2
ECHO LA DIAMETER: 3.6 CM
ECHO LA MAJOR AXIS: 5.1 CM
ECHO LA MINOR AXIS: 5.5 CM
ECHO LA TO AORTIC ROOT RATIO: 1.24
ECHO LA VOL BP: 35 ML (ref 22–52)
ECHO LA VOL MOD A2C: 33 ML (ref 22–52)
ECHO LA VOL MOD A4C: 37 ML (ref 22–52)
ECHO LV E' LATERAL VELOCITY: 8.59 CM/S
ECHO LV EDV 3D: 98 ML
ECHO LV EF PHYSICIAN: 56 %
ECHO LV EJECTION FRACTION 3D: 56 %
ECHO LV ESV 3D: 43 ML
ECHO LV FRACTIONAL SHORTENING: 34 % (ref 28–44)
ECHO LV INTERNAL DIMENSION DIASTOLIC: 4.1 CM (ref 3.9–5.3)
ECHO LV INTERNAL DIMENSION SYSTOLIC: 2.7 CM
ECHO LV IVSD: 0.9 CM (ref 0.6–0.9)
ECHO LV MASS 2D: 123 G (ref 67–162)
ECHO LV MASS 3D: 169 G
ECHO LV POSTERIOR WALL DIASTOLIC: 1 CM (ref 0.6–0.9)
ECHO LV RELATIVE WALL THICKNESS RATIO: 0.49
ECHO LVOT AREA: 2.5 CM2
ECHO LVOT AV VTI INDEX: 0.74
ECHO LVOT DIAM: 1.8 CM
ECHO LVOT MEAN GRADIENT: 2 MMHG
ECHO LVOT PEAK GRADIENT: 3 MMHG
ECHO LVOT PEAK VELOCITY: 0.9 M/S
ECHO LVOT SV: 51.6 ML
ECHO LVOT VTI: 20.3 CM
ECHO MV A VELOCITY: 1.1 M/S
ECHO MV AREA VTI: 1.5 CM2
ECHO MV E DECELERATION TIME (DT): 206 MS
ECHO MV E VELOCITY: 0.89 M/S
ECHO MV E/A RATIO: 0.81
ECHO MV E/E' LATERAL: 10.36
ECHO MV LVOT VTI INDEX: 1.65
ECHO MV MAX VELOCITY: 0.9 M/S
ECHO MV MEAN GRADIENT: 2 MMHG
ECHO MV MEAN VELOCITY: 0.6 M/S
ECHO MV PEAK GRADIENT: 3 MMHG
ECHO MV VTI: 33.4 CM
ECHO PV MAX VELOCITY: 0.9 M/S
ECHO PV PEAK GRADIENT: 3 MMHG
ECHO RV FREE WALL PEAK S': 12.7 CM/S
ECHO RV INTERNAL DIMENSION: 3.8 CM
ECHO RV TAPSE: 2.3 CM (ref 1.7–?)

## 2025-04-22 PROCEDURE — 93306 TTE W/DOPPLER COMPLETE: CPT

## 2025-04-22 PROCEDURE — 93306 TTE W/DOPPLER COMPLETE: CPT | Performed by: INTERNAL MEDICINE

## 2025-04-23 ENCOUNTER — TELEPHONE (OUTPATIENT)
Dept: FAMILY MEDICINE CLINIC | Age: 60
End: 2025-04-23

## 2025-04-23 ENCOUNTER — TELEPHONE (OUTPATIENT)
Age: 60
End: 2025-04-23

## 2025-04-23 NOTE — TELEPHONE ENCOUNTER
Left message to return call. Is pt still in need of FMLA if so  1 time? Need length of time  Intermittent?   Dates

## 2025-04-23 NOTE — TELEPHONE ENCOUNTER
Spoke to patient about her echo results. Patient is cleared for surgery with Dr. Flowers. Called his office spoke to Krysta to let them know patient is cleared. Let them know Dr. Guajardo wrote in his note patient is cleared for surgery. I also reached out to the patient and LVM letting her know echo was normal and she is cleared for surgery. Let her know if she needs anything else to call the office.

## 2025-05-02 ENCOUNTER — TELEPHONE (OUTPATIENT)
Dept: VASCULAR SURGERY | Age: 60
End: 2025-05-02

## 2025-05-02 NOTE — TELEPHONE ENCOUNTER
Called and left message with patient to see if she wants to proceed with rescheduling surgery, we have cardiac clearance.

## 2025-05-05 ENCOUNTER — HOSPITAL ENCOUNTER (OUTPATIENT)
Dept: MAMMOGRAPHY | Age: 60
Discharge: HOME | End: 2025-05-07
Payer: COMMERCIAL

## 2025-05-05 VITALS — BODY MASS INDEX: 36.82 KG/M2 | HEIGHT: 61 IN | WEIGHT: 195 LBS

## 2025-05-05 DIAGNOSIS — Z12.31 VISIT FOR SCREENING MAMMOGRAM: ICD-10-CM

## 2025-05-05 PROCEDURE — 77063 BREAST TOMOSYNTHESIS BI: CPT

## 2025-05-06 ENCOUNTER — RESULTS FOLLOW-UP (OUTPATIENT)
Dept: FAMILY MEDICINE CLINIC | Age: 60
End: 2025-05-06

## (undated) DEVICE — DEFENDO AIR WATER SUCTION AND BIOPSY VALVE KIT FOR  OLYMPUS: Brand: DEFENDO AIR/WATER/SUCTION AND BIOPSY VALVE

## (undated) DEVICE — FORCEPS BX L240CM WRK CHN 2.8MM STD CAP W/ NDL MIC MESH

## (undated) DEVICE — ENDO KIT W/SYRINGE: Brand: MEDLINE INDUSTRIES, INC.